# Patient Record
Sex: FEMALE | Race: WHITE | NOT HISPANIC OR LATINO | Employment: FULL TIME | URBAN - METROPOLITAN AREA
[De-identification: names, ages, dates, MRNs, and addresses within clinical notes are randomized per-mention and may not be internally consistent; named-entity substitution may affect disease eponyms.]

---

## 2024-05-22 ENCOUNTER — APPOINTMENT (OUTPATIENT)
Dept: PRIMARY CARE | Facility: CLINIC | Age: 39
End: 2024-05-22
Payer: COMMERCIAL

## 2024-05-29 ENCOUNTER — OFFICE VISIT (OUTPATIENT)
Dept: PRIMARY CARE | Facility: CLINIC | Age: 39
End: 2024-05-29
Payer: COMMERCIAL

## 2024-05-29 VITALS
WEIGHT: 293 LBS | TEMPERATURE: 98.4 F | HEIGHT: 65 IN | DIASTOLIC BLOOD PRESSURE: 80 MMHG | SYSTOLIC BLOOD PRESSURE: 140 MMHG | BODY MASS INDEX: 48.82 KG/M2 | HEART RATE: 97 BPM | OXYGEN SATURATION: 98 %

## 2024-05-29 DIAGNOSIS — E66.01 OBESITY, MORBID, BMI 50 OR HIGHER (MULTI): ICD-10-CM

## 2024-05-29 DIAGNOSIS — I10 ESSENTIAL (PRIMARY) HYPERTENSION: Primary | ICD-10-CM

## 2024-05-29 DIAGNOSIS — R60.0 BILATERAL LEG EDEMA: ICD-10-CM

## 2024-05-29 PROCEDURE — 99204 OFFICE O/P NEW MOD 45 MIN: CPT | Performed by: FAMILY MEDICINE

## 2024-05-29 PROCEDURE — 3075F SYST BP GE 130 - 139MM HG: CPT | Performed by: FAMILY MEDICINE

## 2024-05-29 PROCEDURE — 3079F DIAST BP 80-89 MM HG: CPT | Performed by: FAMILY MEDICINE

## 2024-05-29 RX ORDER — FUROSEMIDE 20 MG/1
20 TABLET ORAL DAILY PRN
COMMUNITY

## 2024-05-29 RX ORDER — TRIAMTERENE AND HYDROCHLOROTHIAZIDE 37.5; 25 MG/1; MG/1
1 CAPSULE ORAL DAILY PRN
COMMUNITY
End: 2024-06-03 | Stop reason: SDUPTHER

## 2024-05-29 RX ORDER — BUTYROSPERMUM PARKII(SHEA BUTTER), SIMMONDSIA CHINENSIS (JOJOBA) SEED OIL, ALOE BARBADENSIS LEAF EXTRACT .01; 1; 3.5 G/100G; G/100G; G/100G
250 LIQUID TOPICAL 2 TIMES DAILY
COMMUNITY

## 2024-05-29 ASSESSMENT — PAIN SCALES - GENERAL: PAINLEVEL: 0-NO PAIN

## 2024-05-29 ASSESSMENT — PATIENT HEALTH QUESTIONNAIRE - PHQ9
SUM OF ALL RESPONSES TO PHQ9 QUESTIONS 1 AND 2: 0
2. FEELING DOWN, DEPRESSED OR HOPELESS: NOT AT ALL
1. LITTLE INTEREST OR PLEASURE IN DOING THINGS: NOT AT ALL

## 2024-05-29 ASSESSMENT — ENCOUNTER SYMPTOMS: RESPIRATORY NEGATIVE: 1

## 2024-05-29 NOTE — PROGRESS NOTES
"Subjective   Patient ID: Lula Del Angel is a 39 y.o. female who presents for Establish Care and Obesity.    The patient is new to the practice and is transferring her care from Dr. Delmy Kendrick ([facility]).   She is here to establish care.    1) HTN/Bilateral leg edema: uncontrolled/uncontrolled, compliant with triamterene, hydrochlorothiazide, and furosemide as needed, previous PCP instructed the patient to take the medications according to leg edema, tries to follow a low salt diet, for exercise does yard work and house chores, wears compression stockings at times.  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    Review of Systems   Respiratory: Negative.     Cardiovascular:  Positive for leg swelling.     Objective   /90 (BP Location: Left arm)   Pulse 97   Temp 36.9 °C (98.4 °F) (Temporal)   Ht 1.651 m (5' 5\")   Wt (!) 154 kg (340 lb)   SpO2 98%   BMI 56.58 kg/m²     Physical Exam  Vitals and nursing note reviewed.   Constitutional:       Appearance: Normal appearance. She is obese.   Cardiovascular:      Rate and Rhythm: Normal rate and regular rhythm.   Pulmonary:      Effort: Pulmonary effort is normal.      Breath sounds: Normal breath sounds.   Musculoskeletal:      Right lower leg: Edema (nonpitting) present.      Left lower leg: Edema (nonpitting) present.   Neurological:      Mental Status: She is alert.     Assessment/Plan   Diagnoses and all orders for this visit:  Essential (primary) hypertension  Uncontrolled.  Patient will take triamterene and hydrochlorothiazide daily.  Low salt " diet.  Regular exercise.  Follow up in 1 month.  Bilateral leg edema  Uncontrolled.  Continue with furosemide as needed.  Low salt diet.  Elevate legs.    Wear compression stockings.  Obesity, morbid, BMI 50 or higher (Multi)  Uncontrolled.  Referred to Yasmeen Morel [facility] for semaglutide consultation.  Await input.  Follow up as directed.

## 2024-06-03 ENCOUNTER — TELEPHONE (OUTPATIENT)
Dept: PRIMARY CARE | Facility: CLINIC | Age: 39
End: 2024-06-03
Payer: COMMERCIAL

## 2024-06-03 DIAGNOSIS — I10 ESSENTIAL (PRIMARY) HYPERTENSION: ICD-10-CM

## 2024-06-03 RX ORDER — TRIAMTERENE AND HYDROCHLOROTHIAZIDE 37.5; 25 MG/1; MG/1
1 CAPSULE ORAL DAILY
Qty: 90 CAPSULE | Refills: 0 | Status: SHIPPED | OUTPATIENT
Start: 2024-06-03

## 2024-06-03 NOTE — TELEPHONE ENCOUNTER
Rx Refill Request Telephone Encounter    Name:  Lula Del Angel  :  [id number]  Medication Name:  Triamterene 37.5 mg she Is out            Specific Pharmacy location:   [facility]   Date of last appointment:    Date of next appointment:    Best number to reach patient:

## 2024-06-26 ENCOUNTER — TELEPHONE (OUTPATIENT)
Dept: PRIMARY CARE | Facility: CLINIC | Age: 39
End: 2024-06-26

## 2024-06-26 ENCOUNTER — APPOINTMENT (OUTPATIENT)
Dept: PRIMARY CARE | Facility: CLINIC | Age: 39
End: 2024-06-26
Payer: COMMERCIAL

## 2024-06-26 VITALS
OXYGEN SATURATION: 94 % | HEART RATE: 77 BPM | HEIGHT: 65 IN | WEIGHT: 293 LBS | TEMPERATURE: 98.5 F | BODY MASS INDEX: 48.82 KG/M2 | SYSTOLIC BLOOD PRESSURE: 129 MMHG | DIASTOLIC BLOOD PRESSURE: 83 MMHG

## 2024-06-26 DIAGNOSIS — I10 ESSENTIAL (PRIMARY) HYPERTENSION: Primary | ICD-10-CM

## 2024-06-26 DIAGNOSIS — Z00.00 ROUTINE GENERAL MEDICAL EXAMINATION AT A HEALTH CARE FACILITY: ICD-10-CM

## 2024-06-26 DIAGNOSIS — R60.0 BILATERAL LEG EDEMA: ICD-10-CM

## 2024-06-26 DIAGNOSIS — I10 ESSENTIAL (PRIMARY) HYPERTENSION: ICD-10-CM

## 2024-06-26 DIAGNOSIS — E66.01 OBESITY, MORBID, BMI 50 OR HIGHER (MULTI): ICD-10-CM

## 2024-06-26 DIAGNOSIS — Z12.4 CERVICAL CANCER SCREENING: ICD-10-CM

## 2024-06-26 DIAGNOSIS — E66.01 OBESITY, MORBID, BMI 50 OR HIGHER (MULTI): Primary | ICD-10-CM

## 2024-06-26 PROCEDURE — 1036F TOBACCO NON-USER: CPT | Performed by: FAMILY MEDICINE

## 2024-06-26 PROCEDURE — 99214 OFFICE O/P EST MOD 30 MIN: CPT | Performed by: FAMILY MEDICINE

## 2024-06-26 PROCEDURE — 3079F DIAST BP 80-89 MM HG: CPT | Performed by: FAMILY MEDICINE

## 2024-06-26 PROCEDURE — 3074F SYST BP LT 130 MM HG: CPT | Performed by: FAMILY MEDICINE

## 2024-06-26 RX ORDER — FUROSEMIDE 20 MG/1
20 TABLET ORAL DAILY PRN
Qty: 30 TABLET | Refills: 0 | Status: SHIPPED | OUTPATIENT
Start: 2024-06-26

## 2024-06-26 RX ORDER — TRIAMTERENE AND HYDROCHLOROTHIAZIDE 37.5; 25 MG/1; MG/1
1 CAPSULE ORAL DAILY
Qty: 90 CAPSULE | Refills: 0 | Status: SHIPPED | OUTPATIENT
Start: 2024-06-26

## 2024-06-26 ASSESSMENT — PATIENT HEALTH QUESTIONNAIRE - PHQ9
SUM OF ALL RESPONSES TO PHQ9 QUESTIONS 1 AND 2: 0
1. LITTLE INTEREST OR PLEASURE IN DOING THINGS: NOT AT ALL
2. FEELING DOWN, DEPRESSED OR HOPELESS: NOT AT ALL

## 2024-06-26 ASSESSMENT — ENCOUNTER SYMPTOMS
RESPIRATORY NEGATIVE: 1
CARDIOVASCULAR NEGATIVE: 1

## 2024-06-26 NOTE — PROGRESS NOTES
"Subjective   Patient ID: Lula Del Angel is a 39 y.o. female who presents for Blood Pressure Check and Medication Question (Would like to know if she could get furosemide refilled ).    1) HTN/Bilateral leg edema: uncontrolled/uncontrolled, was previously taking triamterene, hydrochlorothiazide, and furosemide as needed according to leg edema status, now taking triamterene and hydrochlorothiazide daily, takes furosemide as needed, checks blood pressure at home and average is in the 120s/60s-70s, tries to follow a low salt diet, for exercise does yard work and house chores, wears compression stockings at times.  2) Morbid obesity: uncontrolled, patient has discussed semaglutides with clinical pharmacist, patient is interested, patient would also like to see a bariatric surgeon.      Review of Systems   Respiratory: Negative.     Cardiovascular: Negative.      Objective   /83 (BP Location: Right arm, Patient Position: Sitting, BP Cuff Size: Adult)   Pulse 77   Temp 36.9 °C (98.5 °F) (Temporal)   Ht 1.651 m (5' 5\")   Wt (!) 153 kg (337 lb)   SpO2 94%   BMI 56.08 kg/m²     Physical Exam  Vitals reviewed.   Constitutional:       Appearance: Normal appearance. She is obese.   Cardiovascular:      Rate and Rhythm: Normal rate and regular rhythm.   Pulmonary:      Effort: Pulmonary effort is normal.      Breath sounds: Normal breath sounds.   Neurological:      Mental Status: She is alert.     Assessment/Plan   Diagnoses and all orders for this visit:  Essential (primary) hypertension  Controlled.  Triamterene-hydrochlorothiazid (Dyazide) 37.5-25 mg capsule refilled.  Continue to check blood pressure at home.  Low salt diet.  Regular exercise.  Manage weight.  Follow up in November for CPE.  Bilateral leg edema  Uncontrolled.  Furosemide (Lasix) 20 mg tablet refilled.  Follow up in November for CPE.    Obesity, morbid, BMI 50 or higher (Multi)  Uncontrolled.  Patient looking at semaglutide vs bariatric " surgery.  Patient met with Yasmeen today.  Referral to Bariatric Surgery ordered.  Await input.  Follow up as directed.    Cervical cancer screening  Referral to Gynecology  Await input.  Follow up as directed.    Other orders  -     Follow Up In Primary Care - Health Maintenance; Future

## 2024-08-14 ENCOUNTER — APPOINTMENT (OUTPATIENT)
Dept: SURGERY | Facility: CLINIC | Age: 39
End: 2024-08-14
Payer: COMMERCIAL

## 2024-08-14 VITALS — BODY MASS INDEX: 48.82 KG/M2 | WEIGHT: 293 LBS | HEIGHT: 65 IN

## 2024-08-14 NOTE — PROGRESS NOTES
"Initial Bariatric Nutrition Assessment    Surgeon:  Luciano  Patient is considering: Sleeve gastrectomy/ RNYGB    ASSESSMENT:  Current weight:   Vitals:    08/14/24 1259   Weight: 150 kg (330 lb)     Ht:  1.651 m (5' 5\")   BMI:  Body mass index is 54.91 kg/m².        Pre-Op Excess Body Weight (EBW):   180lbs          This is a 39 y.o. female with morbid obesity (Body mass index is 54.91 kg/m².) who presents to clinic for consideration of bariatric surgery. she has attempted and failed multiple diet and exercise regimens for weight loss.   Initial Onset of obesity was during adolescence.  Their goal for surgery is to   be healthier, be more active, increase confidence, and prevent an comorbidities . The patient has tried multiple diets to lose weight including  low carb, exercise, Mediterranean diet, Noom, stress management  . The patient has been working with her PCP and has lost 10lbs with following a low carb, high protein diet. The patient considers their dietary weakness to be  time of day, schedule and portion control.  The patient reports a  highest weight ever of 340 pounds and lowest weight ever of 200 pounds.  Struggle with retaining fluids r/t elevated BP; on lasix.     Current diet: low carb high protein   The patients exercise is limited to house and yard work.     Food allergies/intolerances: avocado   Chewing/Swallowing/Dentition: no  Nausea / Vomiting / Hx Gastroparesis:  no  Diarrhea/ Constipation: no  Smoking/Tobacco use: no  Vitamins/Minerals supplements: Mg, probiotic   Hours of sleep/night: 3-6 hours     Medications:     Current Outpatient Medications:     furosemide (Lasix) 20 mg tablet, Take 1 tablet (20 mg) by mouth once daily as needed (leg edema) for up to 30 doses., Disp: 30 tablet, Rfl: 0    saccharomyces boulardii (Florastor) 250 mg capsule, Take 1 capsule (250 mg) by mouth 2 times a day. Surebounty 4 in 1 feminine probiotic, Disp: , Rfl:     triamterene-hydrochlorothiazid (Dyazide) " 37.5-25 mg capsule, Take 1 capsule by mouth once daily., Disp: 90 capsule, Rfl: 0      24 HOUR RECALL/DIET HISTORY:  Breakfast: skip or fruit, nut and cheese packet   Snack:    Lunch: leftovers or turkey sandwich or salad or veggies and dip   Snack: fruit, nut and cheese packet   Dinner: crab legs or turkey/chicken (baked/instapot/air fryer) w/starch and veggie   Snack:   Beverages: ICE and water (64oz)   Alcohol: sperical occasions     Cannot tolerate caffeine.     Person responsible for cooking & shopping?  Self   How often do you eat sweet snacks?   2-3x per week   How often do you eat savory snacks?  Every day   How often do you eat out?  3x+  Do you feel overly stuffed?  Yes- clean plate club   Binge Eating?  No   Night Eating?  no  Emotional Eating?  No        READINESS TO LEARN:  Motivation to learn: Interested        Understanding of instruction: Good   Anticipated Compliance:  Good     Family Support:  had BS           Educational Materials Provided:    Schedules for MSWL class and support group   1400  Calorie meal plan   Goals sheet    Nutrition assessment completed today.  Pt will be scheduled for video education class to discuss the 2 week pre op diet, post op protein and fluid goals, vitamin and mineral supplementation, exercise goals, and post op diet progression closer to the time of surgery    Patient is seeking sleeve gastrectomy/RNYGB    Instructed pt on a 1400 calorie meal plan and to measure and record intake daily. Advised to eat 3 meals per and 1-2 high protein snacks.  Recommend eating 4-5 oz per meal. Reviewed the postop behaviors to start practicing.  Set a goal to track steps daily and aim to get 5,500 steps a day.     Patient was receptive to nutritional recommendations, asked numerous questions, and verbalized understanding of the weight loss surgery diet.  Patient expressed understanding about the importance of strict dietary compliance post-surgery to avoid nutritional  deficiencies and achieve optimal weight loss and verbalized intent to follow dietary recommendations.    Malnutrition Screening:   Significant unintentional weight loss? n/a   Eating less than 75% of usual intake for more than 2 weeks? n/a      Nutrition Diagnosis:   Overweight/obesity related to excess energy intake as evidenced by BMI >= 40 kg/m^2.  Food- and nutrition-related knowledge deficit related to lack of prior exposure to surgical weight loss information as evidenced by pt new to surgical program.    Nutrition Interventions:   Modify type and amount of food and nutrients within meals and snacks.  Comprehensive Nutrition Education    Recommendations:  1. Begin following your meal plan.  Measure and record intake daily.   2. Structure meal patterns, eating three meals and 1-2 snacks per day.  3. Aim for 4-5 oz (25g) protein per meal.  Have 1-2 high protein snacks that are 10-20 g protein each.  You can try a tuna or chicken packet, Greek yogurt, 2 string cheeses, Protein bars like Quest, Pure Protein, Premier, or Built Bars. you can also try protein chips form Quest or Atkins.    4. Drink 64oz of calorie-free, caffeine-free, and non-carbonated beverages. Practice taking sips and avoid straws.   5. Practice no drinking 30 minutes before meals, nothing with meals and wait 30 minutes after meals to drink again. Make meals last 30 minutes-chew thoroughly.   6. Limit or omit eating out/sweets/savory snacks to 1-2 times per week.  7. Begin daily multivitamin.  [employer] [facility] has everything you need.  8. Aim to get 5,500 steps a day. Increase physical activity by 10-15 minutes as tolerated to an end goal of 60 minutes 5 x per week. Consistency is the key.    Pre-op Goal weight: lose 5% of body weight    Nutrition Monitoring and Evaluation: 1-2 pound weight loss per week  Criteria: weight check  Need for Follow-up: one month     Patient does meet National Institutes Health guidelines for weight loss surgery, however  needs to demonstrate consistent effort in making dietary changes before giving clearance. It is anticipated that the patient will need at least 1-2  nutritional follow-up visits prior to clearance for surgery.    Shelby Thomason MS, RD, LD  Phone: [phone]

## 2024-08-19 ENCOUNTER — APPOINTMENT (OUTPATIENT)
Dept: SURGERY | Facility: CLINIC | Age: 39
End: 2024-08-19
Payer: COMMERCIAL

## 2024-08-19 VITALS
HEART RATE: 85 BPM | BODY MASS INDEX: 48.82 KG/M2 | HEIGHT: 65 IN | WEIGHT: 293 LBS | DIASTOLIC BLOOD PRESSURE: 95 MMHG | SYSTOLIC BLOOD PRESSURE: 139 MMHG

## 2024-08-19 DIAGNOSIS — I10 ESSENTIAL (PRIMARY) HYPERTENSION: Primary | ICD-10-CM

## 2024-08-19 DIAGNOSIS — E66.01 OBESITY, MORBID, BMI 50 OR HIGHER (MULTI): ICD-10-CM

## 2024-08-19 PROCEDURE — 3008F BODY MASS INDEX DOCD: CPT | Performed by: SURGERY

## 2024-08-19 PROCEDURE — 3080F DIAST BP >= 90 MM HG: CPT | Performed by: SURGERY

## 2024-08-19 PROCEDURE — 3075F SYST BP GE 130 - 139MM HG: CPT | Performed by: SURGERY

## 2024-08-19 PROCEDURE — 99205 OFFICE O/P NEW HI 60 MIN: CPT | Performed by: SURGERY

## 2024-08-19 NOTE — PROGRESS NOTES
Subjective   Date: 8/19/2024 Time: 12:28 PM  Name: Lula Del Angel  MRN: [id number]  This is a 39 y.o. female with morbid obesity (with a BMI: 54.25d) who presents to clinic for consideration of bariatric surgery. she has attempted and failed multiple diet and exercise regimens for weight loss. Initial Onset of obesity was in childhood.  Their goal for surgery is to  be healthier  and lose weight. The patient has tried multiple diets to lose weight including  mediterranean . The patient was most successful with the  working out and restrictive diet . The most pounds lost on this diet were 80 lbs. The patient considers their dietary weakness to be carbs The patient reports a  highest weight ever of 330 pounds and lowest weight ever of 200 pounds Distribution of Obesity: is central. Current diet:  1200 Calories Per Day. Compliance: General Adherence The patient does not exercise   Comorbidities: hypertension controlled with oral meds  Been dealing with obesity her entire life and looking for a better tool.  I did her husbands gastic bypass 15 years ago.  He has kept off weight for 15 years.  Was 660 lbs.  He is here to support his wife.  He also had kidney transplant.   She notes her weaknesses has to do with convenience.  She does what is easy and not a good meal prepper and uses uber eats a lot.  Tries to eat more mediterranean.   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     Menstrual History: regular cycle intervals    SLEEVE Gastric Surgery For Morbid Obesity Laparoscopic Longitudinal Gastrectomy     No acid reflux issues you at this time.  Only food based (stays away from triggers)  Pmh: htn, notes getting better since life style changes and down 14 lbs.  Using baritastic    FAMILY HISTORY:  Family History   Problem Relation Name Age of Onset    Hypertension Mother      Colon polyps Mother      Hypertension Father      Diabetes Father      Cancer Mother's Sister      Cancer Maternal Grandmother          SOCIAL  HISTORY:  Social History     Tobacco Use    Smoking status: Former     Types: Cigarettes    Smokeless tobacco: Never   Vaping Use    Vaping status: Never Used   Substance Use Topics    Alcohol use: Never    Drug use: Never   Quit smoking in 2017    MEDICATIONS:  Prior to Admission Medications:  Medication Documentation Review Audit       Reviewed by Hilary Wolf MD MPH (Physician) on 08/19/24 at 1222      Medication Order Taking? Sig Documenting Provider Last Dose Status   furosemide (Lasix) 20 mg tablet [id number]  Take 1 tablet (20 mg) by mouth once daily as needed (leg edema) for up to 30 doses. Desire Arce DO  Active   saccharomyces boulardii (Florastor) 250 mg capsule [id number] No Take 1 capsule (250 mg) by mouth 2 times a day. Surebounty 4 in 1 feminine probiotic Historical Provider, MD Taking Active   triamterene-hydrochlorothiazid (Dyazide) 37.5-25 mg capsule [id number]  Take 1 capsule by mouth once daily. Desire Arce DO  Active                 Lasix is very rare when has water retention and uses prn.     ALLERGIES:  Allergies   Allergen Reactions    Avocado GI Upset    Ceclor [Cefaclor] Hives and Fever    Codeine Hives and Fever    Sulfa (Sulfonamide Antibiotics) Hives and Fever       REVIEW OF SYSTEMS:  GENERAL: Negative for malaise, significant weight loss and fever  HEAD: Negative for headache, swelling.  NECK: Negative for lumps, goiter, pain and significant neck swelling  RESPIRATORY: Negative for cough, wheezing or shortness of breath.  CARDIOVASCULAR: Negative for chest pain, leg swelling or palpitations.  GI: Negative for abdominal discomfort, blood in stools or black stools or change in bowel habits  : No history of dysuria, frequency or incontinence  MUSCULOSKELETAL: Negative for joint pain or swelling, back pain or muscle pain.  SKIN: Negative for lesions, rash, and itching.  PSYCH: Negative for sleep disturbance, mood disorder and recent psychosocial stressors.  ENDOCRINE: Negative  "for cold or heat intolerance, polyuria, polydipsia and goiter.    Objective   PHYSICAL EXAM:  Visit Vitals  BP (!) 139/95   Pulse 85   Ht 1.651 m (5' 5\")   Wt 148 kg (326 lb)   BMI 54.25 kg/m²   Smoking Status Former   BSA 2.61 m²     General appearance: obese, NAD  Neuro: AOx3  Head: EOMI; no swelling or lesions of scalp or face  ENT:  no lumps or lymphadenopathy, thyroid normal to palpation; oropharynx clear, no swelling or erythema  Skin: warm, no erythema or rashes  Lungs: clear to percussion and auscultation  Heart: regular rhythm and S1, S2 normal  Abdomen: soft, non-tender, no masses, no organomegaly  Extremities: Normal exam of the extremities. No swelling or pain.  Psych: no hurried speech, no flight of ideas, normal affect    IMPRESSION:  Lula Del Angel is a 39 y.o. female with a bmi of Body mass index is 54.25 kg/m². with the following diagnoses and co-morbidities: htn and can benefits from weight loss.  We discussed the sleeve at length.  She is a great candidate.  She has a supportive  who also had gastric bypass.  We discussed focused exercise. She is low risk for surgery.  She has never had surgery.      We discussed the sleeve  at [facility] and all questions were answered. risks and benefits were discussed  The patient understands the risks and benefits of the procedure and how the procedure is performed. The patient understands the risks include but are not limited to bleeding, infection, DVT, PE, pneumonia, myocardial infarction, leak along the staple lines, and weight regain. We discussed lifestyle changes necessary to be successful.       This patient does meet the criteria for a surgical weight loss procedure according to NIH guidelines.  The risks of sleeve gastrectomy, Asya-en-Y gastric bypass, and duodenal switch surgery including bleeding, leak, wound infection, dehydration, ulcers, internal hernia, DVT/PE, prolonged nausea/vomiting, incomplete resolution of associated medical conditions, " reflux, weight regain, vitamin/mineral deficiencies, and death have been explained to the patient and Lula Del Angel has expressed understanding and acceptance of them.     The increased risk of substance and alcohol abuse following bariatric surgery was discussed with the patient, along with the negative consequences of substance/alcohol use after surgery including addiction, worsening of mental health disorders, and injury to the stomach. The risk of smoking and vaping (tobacco or any other substance) after bariatric surgery was explained to the patient. This includes risk of anastamotic ulcers, gastritis, bleeding, perforation, stricture, and PO intolerance.  The patient expressed understanding and acceptance of these risks.    The benefits of the above surgeries including weight loss, improvement/resolution of associated medical and mental health conditions, improved mobility, and decreased mortality have been explained the the patient and Lula Del Angel has expressed understanding and acceptance of them.  Assessment/Plan   PLAN:  The plan of treatment for Lula Del Angel is to continue with the consultations and tests ordered today in hopes of qualifying for pre-operative clearance for bariatric surgery. This includes:  Meal prep so you don't have to order.   Limit rice, bread and pasta to once/week.   Focused exercfise time with goal of 1 hour/day  No pop or coffee drinks  Consult Nutrition for education and MSWL  Consult Psychology  Consult Physical Therapy for limited mobility  Consult cardiology  Consult pulmonology  Labs/CXR/EKG ordered  EGD  PCP for medical optimization  Consult sleep medicine - concern for CLAYTON    The following are some lifestyle changes you should begin to prepare you for your sleeve surgery.   Eliminate soda and other carbonated beverages from your diet. Carbonation will not be well tolerated after surgery. Try Propel, Vitamin Water Zero, Sobe Lifewater, Crystal Light or water.    Increase  fluid consumption to 64 oz daily. Do not drink within 30 minutes of eating as this will liquefy your food and make you hungry more quickly.    Exercise for 30-60 minutes daily. Brisk walking, bike riding and swimming are all examples of healthy exercise. If you are unable to exercise we recommend seated exercise.    Do not skip meals.    Take a multivitamin daily.    Lose weight. In preparation for your surgery it is important that you begin making healthier food choices now. Our dietitian will meet with you to help you select foods lower in calories and higher in nutrition. We would like you to lose at least 10  lbs prior to surgery.     Increase your protein intake to 60 grams per day.    Alcohol is empty calories. Please eliminate while preparing for surgery.    Plan your meals.      General Instruction: 1) Use the information we gave you today to work through your insurance requirements and medical clearances.   2) These documents need to get faxed to the program navigators so they can submit them for approval from your insurance company.   3) Obtain labs today at a  facility. We will call you with any abnormalities and corrections you need to make.   4) Continue to work with your primary care doctor and other specialist so your other health problems are well controlled prior to your surgery.   5) Adopt the recommendations of the program dietician so you develop healthy eating patterns.   6) Work with the sleep team to get your sleep apnea treated to prevent other health problems .   7) Consider attending a support group to learn from other who have been through the process.   8) Come to the MSWL sessions.   45 minutes were spent with patient including history, physical exam, and education.

## 2024-08-19 NOTE — PATIENT INSTRUCTIONS
PLAN:  The plan of treatment for Lula Del Angel is to continue with the consultations and tests ordered today in hopes of qualifying for pre-operative clearance for bariatric surgery. This includes:  Meal prep so you don't have to order.   Limit rice, bread and pasta to once/week.   Focused exercfise time with goal of 1 hour/day  No pop or coffee drinks  Consult Nutrition for education and MSWL  Consult Psychology  Consult Physical Therapy for limited mobility  Consult cardiology  Consult pulmonology  Labs/CXR/EKG ordered  EGD  PCP for medical optimization  Consult sleep medicine - concern for CLAYTON    The following are some lifestyle changes you should begin to prepare you for your sleeve surgery.   Eliminate soda and other carbonated beverages from your diet. Carbonation will not be well tolerated after surgery. Try Propel, Vitamin Water Zero, Sobe Lifewater, Crystal Light or water.    Increase fluid consumption to 64 oz daily. Do not drink within 30 minutes of eating as this will liquefy your food and make you hungry more quickly.    Exercise for 30-60 minutes daily. Brisk walking, bike riding and swimming are all examples of healthy exercise. If you are unable to exercise we recommend seated exercise.    Do not skip meals.    Take a multivitamin daily.    Lose weight. In preparation for your surgery it is important that you begin making healthier food choices now. Our dietitian will meet with you to help you select foods lower in calories and higher in nutrition. We would like you to lose at least 10  lbs prior to surgery.     Increase your protein intake to 60 grams per day.    Alcohol is empty calories. Please eliminate while preparing for surgery.    Plan your meals.      General Instruction: 1) Use the information we gave you today to work through your insurance requirements and medical clearances.   2) These documents need to get faxed to the program navigators so they can submit them for approval from your  insurance company.   3) Obtain labs today at a  facility. We will call you with any abnormalities and corrections you need to make.   4) Continue to work with your primary care doctor and other specialist so your other health problems are well controlled prior to your surgery.   5) Adopt the recommendations of the program dietician so you develop healthy eating patterns.   6) Work with the sleep team to get your sleep apnea treated to prevent other health problems .   7) Consider attending a support group to learn from other who have been through the process.   8) Come to the MSWL sessions.

## 2024-08-21 ENCOUNTER — DOCUMENTATION (OUTPATIENT)
Dept: SURGERY | Facility: CLINIC | Age: 39
End: 2024-08-21
Payer: COMMERCIAL

## 2024-08-22 ENCOUNTER — DOCUMENTATION (OUTPATIENT)
Dept: SURGERY | Facility: CLINIC | Age: 39
End: 2024-08-22
Payer: COMMERCIAL

## 2024-08-27 DIAGNOSIS — I10 ESSENTIAL (PRIMARY) HYPERTENSION: ICD-10-CM

## 2024-08-27 DIAGNOSIS — Z98.84 BARIATRIC SURGERY STATUS: ICD-10-CM

## 2024-08-27 DIAGNOSIS — Z01.818 PRE-OPERATIVE CLEARANCE: ICD-10-CM

## 2024-08-31 DIAGNOSIS — I10 ESSENTIAL (PRIMARY) HYPERTENSION: ICD-10-CM

## 2024-09-03 RX ORDER — TRIAMTERENE AND HYDROCHLOROTHIAZIDE 37.5; 25 MG/1; MG/1
1 CAPSULE ORAL DAILY
Qty: 90 CAPSULE | Refills: 0 | Status: SHIPPED | OUTPATIENT
Start: 2024-09-03

## 2024-09-11 ENCOUNTER — APPOINTMENT (OUTPATIENT)
Dept: SURGERY | Facility: CLINIC | Age: 39
End: 2024-09-11
Payer: COMMERCIAL

## 2024-09-11 VITALS — WEIGHT: 293 LBS | BODY MASS INDEX: 48.82 KG/M2 | HEIGHT: 65 IN

## 2024-09-11 NOTE — PROGRESS NOTES
"PREOPERATIVE, MULTIDISCIPLINARY, MEDICALLY SUPERVISED, REDUCED CALORIE DIET, BEHAVIOR MODIFICATION AND EXERCISE PROGRAM    S:  Has been finding that time is her main struggle with making lifestyle changes. Noted that meal planning is what can be helpful. Sometimes using frozen meals like Lean Cuisine and Healthy Choice. Has been tracking in the [employer] d[employer]on and has been getting ~1200kcals and getting ~120g of protein.  Working towards finding ways to get less kcals with the foods that she is choosing. Has been limiting her restaurant intake. Has been working on eating slowly and monitoring her satisfied feeling. Recently bought a food scale. Has been struggling with the 30s rule. Has been drinking water, CL and flat ICE and has been getting ~64oz daily. No longer using a straw. Has started to take a MVI. Got a standing desk with a treadmill.     O:    Wt:  319lbs      Ht:  1.651 m (5' 5\")         Body mass index is 53.08 kg/m².      Goal: 5% body weight loss over the course of program    Dietary recommendation:   1. Continue to drink 64oz of noncarbonated/caffeine free/sugar free beverages   2. Practice the 30-30-30 rule by drinking between meals.  3. Continue to track your intake and aim to get 1200-1400kcals daily.   4. Have balanced meals that always contain a good source of protein.  5. Increase intake of non-starchy vegetables.  Have 5 servings fruits and vegetables daily.   6. Take a multivitamin daily.  7. Continue to track your steps. Use your treadmill desk. Increase physical activity by 10-15 minutes to an end goal of 60 minutes 5 x per week.    Group Topic: Vitamins & Minerals    Behavioral recommendation: Pt is encouraged to choose a variety of foods to promote a diverse nutrient profile, as well as supplementing one complete multivitamin every day to complement the profile of oral intake.    A/P: Pt appears to have a good understanding of the potential for micronutrient deficiencies and the importance " of supplementing vitamins and minerals both pre- and post-weight loss surgery.   Pt has a goal to consume a balanced diet and supplement one multivitamin daily pre-op; secondary goal to prepare for post-op vitamin regimen. The pt is dong well and meeting her goals. Is cleared from a nutrition standpoint. Will follow up next month for accountability.     Exercise: tracking steps and getting 3,300 steps daily     Shelby Thomason RDN, LD

## 2024-10-10 ENCOUNTER — APPOINTMENT (OUTPATIENT)
Dept: SURGERY | Facility: CLINIC | Age: 39
End: 2024-10-10
Payer: COMMERCIAL

## 2024-10-10 VITALS — WEIGHT: 293 LBS | HEIGHT: 65 IN | BODY MASS INDEX: 48.82 KG/M2

## 2024-10-10 NOTE — PROGRESS NOTES
"PREOPERATIVE, MULTIDISCIPLINARY, MEDICALLY SUPERVISED, REDUCED CALORIE DIET, BEHAVIOR MODIFICATION AND EXERCISE PROGRAM    S:  The pt continues to feel better holistically as she is losing weight. Continues to work on portion control. Continues to track her intake and is getting 1200-1400kcals daily and >60g of protein daily. Eating 3 meals a day. Making an effort to control her snacking so that it does not become a habit. Continues to work on eating slowly. Is struggling to understand the practicality behind the 30s rule since she has not had surgery yet. Has noticed increased stamina with increased exercise.     O:    Wt: 313lbs     Ht: 1.651 m (5' 5\")        Body mass index is 52.09 kg/m².      Goal: 5% body weight loss over the course of program    Dietary recommendation:   1. Continue to drink 64oz of noncarbonated/caffeine free/sugar free beverages   2. Practice the 30-30-30 rule by drinking between meals.  3. Structure your meal plan - have 3 meals and 1 snack daily.  4. Have balanced meals that always contain a good source of protein.  5. Increase intake of non-starchy vegetables.  Have 5 servings fruits and vegetables daily.   6. Take a multivitamin daily.  7. Continue to track your steps and increase as able. Increase physical activity by 10-15 minutes to an end goal of 60 minutes 5 x per week.    Group Topic: Eating Triggers and Controlling Environment    Behavioral recommendation: Pt will be able to identify eating triggers and how to avoid them.    A/P: Pt appears to be able to recognize eating triggers and how to avoid eating when not hungry and/or not eating when it is not time for a meal or snack. Pt will continue to practice being mindful of healthy eating habits. The pt continues to do well. Will put more of an effort to work on the 30s rule. Will follow up in December.     Exercise: hiking/dancing/walking up to 4,500 steps daily     Shelby Thomason RDN, LD  "

## 2024-10-16 ENCOUNTER — APPOINTMENT (OUTPATIENT)
Dept: SLEEP MEDICINE | Facility: CLINIC | Age: 39
End: 2024-10-16
Payer: COMMERCIAL

## 2024-10-16 DIAGNOSIS — Z98.84 BARIATRIC SURGERY STATUS: ICD-10-CM

## 2024-10-16 DIAGNOSIS — E66.01 MORBID OBESITY WITH BMI OF 50.0-59.9, ADULT (MULTI): ICD-10-CM

## 2024-10-16 DIAGNOSIS — Z01.818 PRE-OPERATIVE CLEARANCE: ICD-10-CM

## 2024-10-16 DIAGNOSIS — G47.30 SLEEP DISORDER BREATHING: Primary | ICD-10-CM

## 2024-10-16 DIAGNOSIS — I10 ESSENTIAL (PRIMARY) HYPERTENSION: ICD-10-CM

## 2024-10-16 PROCEDURE — 99443 PR PHYS/QHP TELEPHONE EVALUATION 21-30 MIN: CPT | Performed by: PSYCHIATRY & NEUROLOGY

## 2024-10-16 PROCEDURE — 99203 OFFICE O/P NEW LOW 30 MIN: CPT | Performed by: PSYCHIATRY & NEUROLOGY

## 2024-10-16 NOTE — PROGRESS NOTES
Patient: Lula Del Angel    [id number]  : 1985 -- AGE 39 y.o.    Provider: Jose Enrique Scott MD     Location [facility]   Service Date: 10/16/2024              [facility]  New Visit Note      Virtual or Telephone Consent  An interactive audio telecommunication system which permits real time communications between the patient (at the originating site) and provider (at the distant site) was utilized to provide this telehealth service.   Verbal consent was requested and obtained from Lula Del Angel on this date, 10/16/24 for a telehealth visit.   I verified the patient's identity and physical location in [place].  If this is a new patient to me, I informed the patient of my name and type of active [place] license that I hold.    Due to a technical issue with EPIC, we are unable to do a video call. This encounter was performed as a telephone encounter.    The patient's referring provider is: Hilary Wolf MD MPH    HPI:  Lula Del Angel is a 39 y.o. female with PMH notable for HTN, morbid obesity and bilateral leg edema, who presents today for bariatric surgery clearance.    She has no concerns about her sleep other than not always getting what she feels is not enough sleep. Sleeping 6-7 hours/night. Functions well overall. No caffeine. Wakes up feeling groggy, but feels alert by 9 AM and gets through the day without major drowsiness or energy problems. She has never been a morning person.       NIGHTTIME SYMPTOMS:   Snoring: yes, told by her , but unknown how loud, unknown how frequent it occurs, was first told about it happening about 1 year ago, unknown if worsening over time. She feels it is worse when in a deep sleep. A few times has awoken from a snore  Witnessed apnea: No  Nocturnal gasping: No  Nocturnal choking: No  Sleep walking: No  Sleep talking:  No  Dream enactment: No  Bruxism: No  Morning headaches: rare, none recently  Morning dry mouth/sore throat: generally  no  Nocturia: rare  Restless sleep: No  Sleep paralysis: No was frozen he cannot move  Hypnagogic/hypnopompic hallucinations: No  Bedroom environment is conducive to sleep:  snores    DAYTIME SYMPTOMS  Daytime sleepiness: No  Fatigue: No  Trouble with memory/concentration: No  Dozing: occasionally on a weekend for a few minutes when sitting down after [employer]  Feeling sleepy while driving: Denies    RLS symptoms: No   Cataplexy: No    SLEEP HABITS:   Preferred sleep position: supine has been preferred for the last 4 years. Previously preferred sleeping on her right side  Bedtime: around 10 pm, later if she is scrolling on her phone for too long, but tries to put it away around 10 pm, sleep latency usually within a few minutes. Falls asleep to the TV being on for the noise. Sometimes later on in the night she wakes up and turns off the TV because she is bothered by the light.  Wake time: 7 am  # of nocturnal awakenings: up to 1 to reposition  Napping: no  Total estimated sleep per 24 hrs: 6-8 hours    PRIOR SLEEP STUDIES:  No     PRIOR TREATMENTS:  No stimulants or sleep aids.    Patient Active Problem List   Diagnosis    Essential (primary) hypertension    Obesity, morbid, BMI 50 or higher (Multi)    Bilateral leg edema     No past medical history on file.  No past surgical history on file.  Current Outpatient Medications   Medication Sig Dispense Refill    furosemide (Lasix) 20 mg tablet Take 1 tablet (20 mg) by mouth once daily as needed (leg edema) for up to 30 doses. 30 tablet 0    saccharomyces boulardii (Florastor) 250 mg capsule Take 1 capsule (250 mg) by mouth 2 times a day. Surebounty 4 in 1 feminine probiotic      triamterene-hydrochlorothiazid (Dyazide) 37.5-25 mg capsule TAKE 1 CAPSULE BY MOUTH DAILY 90 capsule 0     No current facility-administered medications for this visit.     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 Smoked about 1/3 pack per day for 15 years  Illicits: no  Caffeine: none      ROS: 12 point ROS positive for SOB with exertion going up 2 steep flights of stairs, weight loss (30 lbs intentionally in the last few months). All other systems were reviewed are negative.    PHYSICAL EXAMINATION:   Height 5' 5\", weight 311 lbs  General: sounds comfortable.   Speech: Normal  Comprehension: Normal  Mood: Stable  Affect: unable to assess  Eyes:   Eyelids: unable to assess            ENT:          Unable to assess patency of nasal passageways or for presence of nasal septum deviation. Unable to clearly view posterior oropharynx to assess tonsils, uvula, and Figueroa tongue score, unable to assess for retrognathia, for presence of high arching hard palate, or dentition quality.         Neck:          unable to assess  Cardiac:  unable to assess pulses or cardiac rate/rhythm.  Pul:        speaking in full sentences. No wheezing  Abd:         unable to assess for obesity  Neuro: Alert, well-oriented.       LABS/DIAGNOSTICS:  Labs from [facility] from 1/2022 include:  Hgb 13.0  CO2 from BMP: 27  TSH 3.43    Echo: none  PFTs: none        ASSESSMENT AND PLAN: Ms. Lula Del Angel is a 39 y.o. female with a history of morbid obesity undergoing the bariatric surgery process, who has known snoring. Due to the potential for increased perioperative risks in the bariatric surgery population, testing for sleep apnea and treating if needed prior to surgery " is recommended.    #morbid obesity  #preoperative clearance  #bariatric surgery status  -clearance depends on sleep study results and if CPAP compliance is needed    #sleep disordered breathing  -We discussed the risk factors for sleep apnea, pathophysiology of sleep apnea, treatment options, and potential long-term complications of untreated CLAYTON, including cardiovascular and metabolic complications. We will start evaluation with a home sleep test.     #HTN  -underlying CLAYTON may be contributing  -home sleep study    All of the above was discussed with the patient in detail. She voiced an understanding of the above and was agreeable to proceed further as advised. Procedure for the sleep study was discussed with her.    41 min spent on this encounter.    FOLLOW UP:  After study to discuss results

## 2024-10-26 ENCOUNTER — LAB (OUTPATIENT)
Dept: LAB | Facility: LAB | Age: 39
End: 2024-10-26
Payer: COMMERCIAL

## 2024-10-26 DIAGNOSIS — Z98.84 BARIATRIC SURGERY STATUS: ICD-10-CM

## 2024-10-26 DIAGNOSIS — I10 ESSENTIAL (PRIMARY) HYPERTENSION: ICD-10-CM

## 2024-10-26 DIAGNOSIS — Z00.00 ROUTINE GENERAL MEDICAL EXAMINATION AT A HEALTH CARE FACILITY: ICD-10-CM

## 2024-10-26 DIAGNOSIS — Z01.818 PRE-OPERATIVE CLEARANCE: ICD-10-CM

## 2024-10-26 LAB
25(OH)D3 SERPL-MCNC: 45 NG/ML (ref 30–100)
ALBUMIN SERPL BCP-MCNC: 3.9 G/DL (ref 3.4–5)
ALP SERPL-CCNC: 57 U/L (ref 33–110)
ALT SERPL W P-5'-P-CCNC: 18 U/L (ref 7–45)
AMPHETAMINES UR QL SCN: NORMAL
ANION GAP SERPL CALC-SCNC: 15 MMOL/L (ref 10–20)
APPEARANCE UR: CLEAR
APTT PPP: 36 SECONDS (ref 27–38)
AST SERPL W P-5'-P-CCNC: 14 U/L (ref 9–39)
BARBITURATES UR QL SCN: NORMAL
BASOPHILS # BLD AUTO: 0.02 X10*3/UL (ref 0–0.1)
BASOPHILS NFR BLD AUTO: 0.3 %
BENZODIAZ UR QL SCN: NORMAL
BILIRUB SERPL-MCNC: 0.6 MG/DL (ref 0–1.2)
BILIRUB UR STRIP.AUTO-MCNC: NEGATIVE MG/DL
BUN SERPL-MCNC: 16 MG/DL (ref 6–23)
BZE UR QL SCN: NORMAL
CALCIUM SERPL-MCNC: 9 MG/DL (ref 8.6–10.3)
CANNABINOIDS UR QL SCN: NORMAL
CHLORIDE SERPL-SCNC: 100 MMOL/L (ref 98–107)
CHOLEST SERPL-MCNC: 130 MG/DL (ref 0–199)
CHOLESTEROL/HDL RATIO: 3.3
CO2 SERPL-SCNC: 28 MMOL/L (ref 21–32)
COLOR UR: ABNORMAL
CREAT SERPL-MCNC: 0.69 MG/DL (ref 0.5–1.05)
CREAT UR-MCNC: 104.3 MG/DL (ref 20–320)
EGFRCR SERPLBLD CKD-EPI 2021: >90 ML/MIN/1.73M*2
EOSINOPHIL # BLD AUTO: 0.31 X10*3/UL (ref 0–0.7)
EOSINOPHIL NFR BLD AUTO: 4.6 %
ERYTHROCYTE [DISTWIDTH] IN BLOOD BY AUTOMATED COUNT: 14.6 % (ref 11.5–14.5)
EST. AVERAGE GLUCOSE BLD GHB EST-MCNC: 97 MG/DL
FENTANYL+NORFENTANYL UR QL SCN: NORMAL
FERRITIN SERPL-MCNC: 64 NG/ML (ref 8–150)
FOLATE SERPL-MCNC: 10.7 NG/ML
GLUCOSE SERPL-MCNC: 94 MG/DL (ref 74–99)
GLUCOSE UR STRIP.AUTO-MCNC: NORMAL MG/DL
HBA1C MFR BLD: 5 %
HCT VFR BLD AUTO: 43.5 % (ref 36–46)
HDLC SERPL-MCNC: 39.7 MG/DL
HGB BLD-MCNC: 14.3 G/DL (ref 12–16)
IMM GRANULOCYTES # BLD AUTO: 0.03 X10*3/UL (ref 0–0.7)
IMM GRANULOCYTES NFR BLD AUTO: 0.4 % (ref 0–0.9)
INR PPP: 1.2 (ref 0.9–1.1)
IRON SATN MFR SERPL: 19 % (ref 25–45)
IRON SERPL-MCNC: 65 UG/DL (ref 35–150)
KETONES UR STRIP.AUTO-MCNC: ABNORMAL MG/DL
LDLC SERPL CALC-MCNC: 71 MG/DL
LEUKOCYTE ESTERASE UR QL STRIP.AUTO: NEGATIVE
LYMPHOCYTES # BLD AUTO: 1.21 X10*3/UL (ref 1.2–4.8)
LYMPHOCYTES NFR BLD AUTO: 18 %
MCH RBC QN AUTO: 28.3 PG (ref 26–34)
MCHC RBC AUTO-ENTMCNC: 32.9 G/DL (ref 32–36)
MCV RBC AUTO: 86 FL (ref 80–100)
METHADONE UR QL SCN: NORMAL
MICROALBUMIN UR-MCNC: <7 MG/L
MICROALBUMIN/CREAT UR: NORMAL MG/G{CREAT}
MONOCYTES # BLD AUTO: 0.46 X10*3/UL (ref 0.1–1)
MONOCYTES NFR BLD AUTO: 6.8 %
NEUTROPHILS # BLD AUTO: 4.7 X10*3/UL (ref 1.2–7.7)
NEUTROPHILS NFR BLD AUTO: 69.9 %
NITRITE UR QL STRIP.AUTO: NEGATIVE
NON HDL CHOLESTEROL: 90 MG/DL (ref 0–149)
NRBC BLD-RTO: 0 /100 WBCS (ref 0–0)
OPIATES UR QL SCN: NORMAL
OXYCODONE+OXYMORPHONE UR QL SCN: NORMAL
PCP UR QL SCN: NORMAL
PH UR STRIP.AUTO: 6 [PH]
PLATELET # BLD AUTO: 345 X10*3/UL (ref 150–450)
POTASSIUM SERPL-SCNC: 4 MMOL/L (ref 3.5–5.3)
PROT SERPL-MCNC: 7 G/DL (ref 6.4–8.2)
PROT UR STRIP.AUTO-MCNC: NEGATIVE MG/DL
PROTHROMBIN TIME: 13.1 SECONDS (ref 9.8–12.8)
PTH-INTACT SERPL-MCNC: 69.2 PG/ML (ref 18.5–88)
RBC # BLD AUTO: 5.05 X10*6/UL (ref 4–5.2)
RBC # UR STRIP.AUTO: NEGATIVE /UL
SODIUM SERPL-SCNC: 139 MMOL/L (ref 136–145)
SP GR UR STRIP.AUTO: 1.02
T4 FREE SERPL-MCNC: 1.14 NG/DL (ref 0.61–1.12)
TIBC SERPL-MCNC: 341 UG/DL (ref 240–445)
TRIGL SERPL-MCNC: 97 MG/DL (ref 0–149)
TSH SERPL-ACNC: 8.11 MIU/L (ref 0.44–3.98)
UIBC SERPL-MCNC: 276 UG/DL (ref 110–370)
UROBILINOGEN UR STRIP.AUTO-MCNC: NORMAL MG/DL
VIT B12 SERPL-MCNC: 520 PG/ML (ref 211–911)
VLDL: 19 MG/DL (ref 0–40)
WBC # BLD AUTO: 6.7 X10*3/UL (ref 4.4–11.3)

## 2024-10-26 PROCEDURE — 83540 ASSAY OF IRON: CPT

## 2024-10-26 PROCEDURE — 82570 ASSAY OF URINE CREATININE: CPT

## 2024-10-26 PROCEDURE — 84630 ASSAY OF ZINC: CPT

## 2024-10-26 PROCEDURE — 82306 VITAMIN D 25 HYDROXY: CPT

## 2024-10-26 PROCEDURE — 80307 DRUG TEST PRSMV CHEM ANLYZR: CPT

## 2024-10-26 PROCEDURE — 84439 ASSAY OF FREE THYROXINE: CPT

## 2024-10-26 PROCEDURE — 36415 COLL VENOUS BLD VENIPUNCTURE: CPT

## 2024-10-26 PROCEDURE — 85730 THROMBOPLASTIN TIME PARTIAL: CPT

## 2024-10-26 PROCEDURE — 82043 UR ALBUMIN QUANTITATIVE: CPT

## 2024-10-26 PROCEDURE — 85610 PROTHROMBIN TIME: CPT

## 2024-10-26 PROCEDURE — 80053 COMPREHEN METABOLIC PANEL: CPT

## 2024-10-26 PROCEDURE — 82607 VITAMIN B-12: CPT

## 2024-10-26 PROCEDURE — 80323 ALKALOIDS NOS: CPT

## 2024-10-26 PROCEDURE — 83036 HEMOGLOBIN GLYCOSYLATED A1C: CPT

## 2024-10-26 PROCEDURE — 81003 URINALYSIS AUTO W/O SCOPE: CPT

## 2024-10-26 PROCEDURE — 82525 ASSAY OF COPPER: CPT

## 2024-10-26 PROCEDURE — 83550 IRON BINDING TEST: CPT

## 2024-10-26 PROCEDURE — 85025 COMPLETE CBC W/AUTO DIFF WBC: CPT

## 2024-10-26 PROCEDURE — 84443 ASSAY THYROID STIM HORMONE: CPT

## 2024-10-26 PROCEDURE — 84425 ASSAY OF VITAMIN B-1: CPT

## 2024-10-26 PROCEDURE — 82728 ASSAY OF FERRITIN: CPT

## 2024-10-26 PROCEDURE — 80061 LIPID PANEL: CPT

## 2024-10-26 PROCEDURE — 83970 ASSAY OF PARATHORMONE: CPT

## 2024-10-26 PROCEDURE — 82746 ASSAY OF FOLIC ACID SERUM: CPT

## 2024-10-30 LAB
COTININE SERPL-MCNC: <5 NG/ML
NICOTINE SERPL-MCNC: <5 NG/ML
VIT B1 PYROPHOSHATE BLD-SCNC: 132 NMOL/L (ref 70–180)

## 2024-11-01 ENCOUNTER — PROCEDURE VISIT (OUTPATIENT)
Dept: SLEEP MEDICINE | Facility: HOSPITAL | Age: 39
End: 2024-11-01
Payer: COMMERCIAL

## 2024-11-01 DIAGNOSIS — Z98.84 BARIATRIC SURGERY STATUS: ICD-10-CM

## 2024-11-01 DIAGNOSIS — Z01.818 PRE-OPERATIVE CLEARANCE: ICD-10-CM

## 2024-11-01 DIAGNOSIS — I10 ESSENTIAL (PRIMARY) HYPERTENSION: ICD-10-CM

## 2024-11-01 DIAGNOSIS — E66.01 MORBID OBESITY WITH BMI OF 50.0-59.9, ADULT (MULTI): ICD-10-CM

## 2024-11-01 DIAGNOSIS — G47.30 SLEEP DISORDER BREATHING: ICD-10-CM

## 2024-11-01 PROCEDURE — 95806 SLEEP STUDY UNATT&RESP EFFT: CPT | Performed by: PSYCHIATRY & NEUROLOGY

## 2024-11-06 ENCOUNTER — APPOINTMENT (OUTPATIENT)
Dept: PRIMARY CARE | Facility: CLINIC | Age: 39
End: 2024-11-06
Payer: COMMERCIAL

## 2024-11-06 VITALS
TEMPERATURE: 97.6 F | HEART RATE: 67 BPM | BODY MASS INDEX: 48.82 KG/M2 | WEIGHT: 293 LBS | SYSTOLIC BLOOD PRESSURE: 124 MMHG | DIASTOLIC BLOOD PRESSURE: 77 MMHG | OXYGEN SATURATION: 96 % | HEIGHT: 65 IN

## 2024-11-06 DIAGNOSIS — Z00.00 WELL ADULT EXAM: Primary | ICD-10-CM

## 2024-11-06 DIAGNOSIS — Z12.31 ENCOUNTER FOR SCREENING MAMMOGRAM FOR BREAST CANCER: ICD-10-CM

## 2024-11-06 DIAGNOSIS — Z12.4 CERVICAL CANCER SCREENING: ICD-10-CM

## 2024-11-06 DIAGNOSIS — I10 ESSENTIAL (PRIMARY) HYPERTENSION: ICD-10-CM

## 2024-11-06 DIAGNOSIS — R60.0 BILATERAL LEG EDEMA: ICD-10-CM

## 2024-11-06 DIAGNOSIS — E66.01 OBESITY, MORBID, BMI 50 OR HIGHER (MULTI): ICD-10-CM

## 2024-11-06 PROCEDURE — 3008F BODY MASS INDEX DOCD: CPT | Performed by: FAMILY MEDICINE

## 2024-11-06 PROCEDURE — 99396 PREV VISIT EST AGE 40-64: CPT | Performed by: FAMILY MEDICINE

## 2024-11-06 PROCEDURE — 1036F TOBACCO NON-USER: CPT | Performed by: FAMILY MEDICINE

## 2024-11-06 PROCEDURE — 99214 OFFICE O/P EST MOD 30 MIN: CPT | Performed by: FAMILY MEDICINE

## 2024-11-06 PROCEDURE — 3074F SYST BP LT 130 MM HG: CPT | Performed by: FAMILY MEDICINE

## 2024-11-06 PROCEDURE — 93000 ELECTROCARDIOGRAM COMPLETE: CPT | Performed by: FAMILY MEDICINE

## 2024-11-06 PROCEDURE — 3078F DIAST BP <80 MM HG: CPT | Performed by: FAMILY MEDICINE

## 2024-11-06 RX ORDER — TRIAMTERENE AND HYDROCHLOROTHIAZIDE 37.5; 25 MG/1; MG/1
1 CAPSULE ORAL DAILY
Qty: 90 CAPSULE | Refills: 1 | Status: SHIPPED | OUTPATIENT
Start: 2024-11-06

## 2024-11-06 ASSESSMENT — ENCOUNTER SYMPTOMS
RESPIRATORY NEGATIVE: 1
HEMATOLOGIC/LYMPHATIC NEGATIVE: 1
GASTROINTESTINAL NEGATIVE: 1
NEUROLOGICAL NEGATIVE: 1
PSYCHIATRIC NEGATIVE: 1
ALLERGIC/IMMUNOLOGIC NEGATIVE: 1
MUSCULOSKELETAL NEGATIVE: 1
CARDIOVASCULAR NEGATIVE: 1
EYES NEGATIVE: 1
CONSTITUTIONAL NEGATIVE: 1

## 2024-11-06 ASSESSMENT — PROMIS GLOBAL HEALTH SCALE
RATE_MENTAL_HEALTH: VERY GOOD
CARRYOUT_PHYSICAL_ACTIVITIES: COMPLETELY
RATE_AVERAGE_PAIN: 0
CARRYOUT_SOCIAL_ACTIVITIES: EXCELLENT
RATE_PHYSICAL_HEALTH: VERY GOOD
RATE_QUALITY_OF_LIFE: VERY GOOD
RATE_GENERAL_HEALTH: VERY GOOD
RATE_SOCIAL_SATISFACTION: VERY GOOD
EMOTIONAL_PROBLEMS: RARELY

## 2024-11-06 ASSESSMENT — COLUMBIA-SUICIDE SEVERITY RATING SCALE - C-SSRS
2. HAVE YOU ACTUALLY HAD ANY THOUGHTS OF KILLING YOURSELF?: NO
1. IN THE PAST MONTH, HAVE YOU WISHED YOU WERE DEAD OR WISHED YOU COULD GO TO SLEEP AND NOT WAKE UP?: NO
6. HAVE YOU EVER DONE ANYTHING, STARTED TO DO ANYTHING, OR PREPARED TO DO ANYTHING TO END YOUR LIFE?: NO

## 2024-11-06 ASSESSMENT — PATIENT HEALTH QUESTIONNAIRE - PHQ9
2. FEELING DOWN, DEPRESSED OR HOPELESS: NOT AT ALL
SUM OF ALL RESPONSES TO PHQ9 QUESTIONS 1 AND 2: 0
1. LITTLE INTEREST OR PLEASURE IN DOING THINGS: NOT AT ALL

## 2024-11-06 NOTE — PROGRESS NOTES
"Subjective   Patient ID: Lula Del Angel is a 39 y.o. female who presents for Annual Exam (Pt wants to discuss blood work ).    Tetanus vaccination is UTD.  She declines an influenza vaccination.  Cervical cancer screening is UTD.  She is under the care of gynecology (Dr. Shaista Llamas at the [facility]).  She is due for a screening mammogram and agrees to receive an order today.  She is due for an EKG and agrees to have one completed today.  Fasting labs were completed recently.    1) HTN/Bilateral leg edema: controlled/uncontrolled, compliant with triamterene and hydrochlorothiazide daily, takes furosemide as needed, checks blood pressure at home and average is in the 110s-120s/60s-70s, tries to follow a low salt diet and follows a 1400 dk/day diet, for exercise does yard work and house chores, will start to use a standing treadmill, wears compression stockings at times.  2) Morbid obesity: uncontrolled, patient undergoing bariatric surgery process, baseline weight was 337 pounds, total current weight loss is 32 pounds, exercise as stated above.    Review of Systems   Constitutional: Negative.    HENT: Negative.     Eyes: Negative.    Respiratory: Negative.     Cardiovascular: Negative.    Gastrointestinal: Negative.    Genitourinary: Negative.    Musculoskeletal: Negative.    Skin: Negative.    Allergic/Immunologic: Negative.    Neurological: Negative.    Hematological: Negative.    Psychiatric/Behavioral: Negative.       Objective   /77 (BP Location: Left arm, Patient Position: Sitting, BP Cuff Size: Adult)   Pulse 67   Temp 36.4 °C (97.6 °F) (Temporal)   Ht 1.651 m (5' 5\")   Wt 138 kg (305 lb)   SpO2 96%   BMI 50.75 kg/m²     Physical Exam  Vitals reviewed.   Constitutional:       Appearance: Normal appearance. She is obese.   HENT:      Head: Normocephalic and atraumatic.      Right Ear: Tympanic membrane, ear canal and external ear normal.      Left Ear: Tympanic membrane, ear canal and external ear " normal.      Nose: Nose normal.      Mouth/Throat:      Mouth: Mucous membranes are moist.      Pharynx: Oropharynx is clear.   Eyes:      Extraocular Movements: Extraocular movements intact.      Conjunctiva/sclera: Conjunctivae normal.      Pupils: Pupils are equal, round, and reactive to light.   Neck:      Vascular: No carotid bruit.      Comments: No thyromegaly  Cardiovascular:      Rate and Rhythm: Normal rate and regular rhythm.      Pulses: Normal pulses.      Heart sounds: Normal heart sounds.   Pulmonary:      Effort: Pulmonary effort is normal.      Breath sounds: Normal breath sounds.   Abdominal:      General: Bowel sounds are normal.      Palpations: Abdomen is soft.   Musculoskeletal:         General: Normal range of motion.      Cervical back: Normal range of motion and neck supple.      Comments: 5/5 muscle strength x 4 extremities   Skin:     General: Skin is warm and dry.   Neurological:      General: No focal deficit present.      Mental Status: She is alert and oriented to person, place, and time.   Psychiatric:         Mood and Affect: Mood normal.         Behavior: Behavior normal.     Assessment/Plan   Diagnoses and all orders for this visit:  Well adult exam  PE completed.  Tetanus vaccination UTD.  Patient declines influenza vaccination.  Cervical cancer screening UTD.  Referred to new gyn (previous one is too far to continue seeing).  EKG completed.  Fasting labs completed.  Repeat exam in 1 year.  Essential (primary) hypertension  Controlled  Triamterene-hydrochlorothiazid (Dyazide) 37.5-25 mg capsule prescribed.  ECG 12 Lead completed.  Continue to check blood pressure at home.  Low salt diet.  Regular exercise.  Manage weight.  Follow up in 6 months if not under the care of cardiology.  Bilateral leg edema  Stable  Triamterene-hydrochlorothiazid (Dyazide) 37.5-25 mg capsule refilled.  Continue with furosemide as needed.  Compression stockings as needed.  Follow up in 1 year.   Obesity,  morbid, BMI 50 or higher (Multi)  Uncontrolled but improving  Continue with bariatric program.  Follow up in 1 year.   Encounter for screening mammogram for breast cancer  BI mammo bilateral screening tomosynthesis ordered.  Await input.  Cervical cancer screening  Referral to Gynecology  Await input.  Follow up as directed.  Other orders  -     Follow Up In Primary Care - Health Maintenance  -     Follow Up In Primary Care - Health Maintenance; Future

## 2024-11-09 ENCOUNTER — TELEMEDICINE CLINICAL SUPPORT (OUTPATIENT)
Dept: SURGERY | Facility: CLINIC | Age: 39
End: 2024-11-09
Payer: COMMERCIAL

## 2024-11-20 DIAGNOSIS — G47.33 OSA (OBSTRUCTIVE SLEEP APNEA): Primary | ICD-10-CM

## 2024-12-10 ENCOUNTER — TELEPHONE (OUTPATIENT)
Dept: SURGERY | Facility: CLINIC | Age: 39
End: 2024-12-10
Payer: COMMERCIAL

## 2024-12-11 ENCOUNTER — APPOINTMENT (OUTPATIENT)
Dept: SURGERY | Facility: CLINIC | Age: 39
End: 2024-12-11
Payer: COMMERCIAL

## 2024-12-11 VITALS — BODY MASS INDEX: 48.82 KG/M2 | HEIGHT: 65 IN | WEIGHT: 293 LBS

## 2024-12-11 NOTE — PROGRESS NOTES
"PREOPERATIVE, MULTIDISCIPLINARY, MEDICALLY SUPERVISED, REDUCED CALORIE DIET, BEHAVIOR MODIFICATION AND EXERCISE PROGRAM    S:  The pt feels like she did well with staying on track with her goals on Thanksgiving. Used a food scale to help with portion sizes. Continues to track her intake and getting 1200-1400kcals and getting at least 60g of protein. Enjoys eating healthy and making good foods choices.  Continues post op behaviors. Attended the NBP class and was very reflective about what permanent changes she will have to make to her lifestyle. Hit a weight plateau and felt discouraged. Found that it is better for her to space out her weight ins.     O:    Wt: 296lbs      Ht: 1.651 m (5' 5\")         Body mass index is 49.26 kg/m².      Goal: 5% body weight loss over the course of program    Dietary recommendation:   1. Continue to drink 64oz of noncarbonated/caffeine free/sugar free beverages   2. Practice the 30-30-30 rule by drinking between meals.  3. Structure your meal plan - have 3 meals and 1 snack daily.  4. Have balanced meals that always contain a good source of protein.  5. Increase intake of non-starchy vegetables.  Have 5 servings fruits and vegetables daily.   6. Take a multivitamin daily.  7. Continue to track your steps. Increase physical activity by 10-15 minutes to an end goal of 60 minutes 5 x per week.    Group Topic: Emotional Eating    Behavioral recommendation: Pt is encouraged to practice identifying the differences between emotional hunger and physical hunger and to implement distraction and confrontation techniques to avoid emotional eating.     A/P: Pt appears to have a good understanding of how to characterize the two different types of hunger.  Pt has a goal to keep a food diary and to utilize at least two of the techniques learned in class to deter emotional eating. The pt continues to do well and meet her goals. Pt is looking to have surgery in May. Will follow up in March for a check " in.     Exercise: uses treadmill, getting ~4-5,000 steps daily     Shelby Thomason RDN, LD

## 2025-01-17 ENCOUNTER — APPOINTMENT (OUTPATIENT)
Dept: SLEEP MEDICINE | Facility: CLINIC | Age: 40
End: 2025-01-17
Payer: COMMERCIAL

## 2025-01-17 DIAGNOSIS — E66.01 MORBID OBESITY WITH BMI OF 50.0-59.9, ADULT (MULTI): ICD-10-CM

## 2025-01-17 DIAGNOSIS — Z98.84 BARIATRIC SURGERY STATUS: ICD-10-CM

## 2025-01-17 DIAGNOSIS — Z01.818 PRE-OPERATIVE CLEARANCE: ICD-10-CM

## 2025-01-17 DIAGNOSIS — G47.33 OSA (OBSTRUCTIVE SLEEP APNEA): Primary | ICD-10-CM

## 2025-01-17 PROCEDURE — 1036F TOBACCO NON-USER: CPT | Performed by: PSYCHIATRY & NEUROLOGY

## 2025-01-17 PROCEDURE — 99212 OFFICE O/P EST SF 10 MIN: CPT | Performed by: PSYCHIATRY & NEUROLOGY

## 2025-01-17 NOTE — PROGRESS NOTES
Patient: Lula Del Angel    [id number]  : 1985 -- AGE 39 y.o.    Provider: Jose Enrique Scott MD     Location [facility]   Service Date: 2025              [facility]  Follow-up Note      Virtual or Telephone Consent  An interactive audio and video telecommunication system which permits real time communications between the patient (at the originating site) and provider (at the distant site) was utilized to provide this telehealth service.   Verbal consent was requested and obtained from Lula Del Angel on this date, 25 for a telehealth visit.   I verified the patient's identity and physical location in [place].  If this is a new patient to me, I informed the patient of my name and type of active [place] license that I hold.      HPI: Lula Del Angel is a 39 y.o. female with CLAYTON who was recently started on CPAP. She is going through the bariatric surgery process. She is here today for her first CPAP follow up visit.     Using CPAP most nights. Sometimes takes it off after 4-5 hours. Tolerates it well overall. No morning dry mouth.    Eager to get her bariatric surgery clearance.      Does not know if she snores still.    PAP DEVICE   Setup date: 24  Type: CPAP  Finding benefit: no - has felt more fatigued from sleeping with CPAP, but not a consistent issue    MASK  Type: F&P nasal Solo    Prior Sleep studies:   -HST 24: weight 138.8 kg, BMI 50.92. CLAYTON with AHI3% 17/h, AHI4% 9/h. No time spent off-supine. Mean SpO2 92.4%, asaf 81%, <=88% for 9.4 minutes. Respiratory events generally occurred in 4 cyclic clusters, suggesting a REM sleep predominance, though this cannot be confirmed on this study due to lack of EEG monitoring.           -report and hypnogram were reviewed personally by me today.      REVIEW OF MACHINE DOWNLOAD:     Patient Active Problem List   Diagnosis    Essential (primary) hypertension    Obesity, morbid, BMI 50 or higher (Multi)    Bilateral  leg edema    CLAYTON (obstructive sleep apnea)     Past Medical History:   Diagnosis Date    Hypertension      Past Surgical History:   Procedure Laterality Date    WISDOM TOOTH EXTRACTION       Current Outpatient Medications on File Prior to Visit   Medication Sig Dispense Refill    furosemide (Lasix) 20 mg tablet Take 1 tablet (20 mg) by mouth once daily as needed (leg edema) for up to 30 doses. 30 tablet 0    saccharomyces boulardii (Florastor) 250 mg capsule Take 1 capsule (250 mg) by mouth 2 times a day. Surebounty 4 in 1 feminine probiotic      triamterene-hydrochlorothiazid (Dyazide) 37.5-25 mg capsule Take 1 capsule by mouth once daily. 90 capsule 1     No current facility-administered medications on file prior to visit.       PHYSICAL EXAMINATION:  General: Awake. Alert. Comfortable. No apparent distress.   Speech: Normal.  Comprehension: Normal.  Mood: Stable.  Affect: Appropriate.  Pul:         Normal respiratory effort.   Neuro: Alert, well-oriented. Cranial nerves II-XII grossly normal and symmetric.  Moves all limbs symmetrically with no evidence of significant focal weakness. No abnormal movements noted. Normal gait      ASSESSMENT AND PLAN: Ms. Lula Del Angel is a 39 y.o. female with mod CLAYTON on CPAP, doing well tolerating it. Sleep apnea is resolved with CPAP. Her compliance is good, just shy of meeting compliance requirements. She is motivated to continue using CPAP, though she notices no subjective benefit. Going through bariatric surgery process and is awaiting clearance from other specialties.      #CLAYTON  -continue nightly CPAP leading up to surgery and in the months following surgery  -I will check her compliance report next week and send her and her surgeon a message regarding surgical clearance    #morbid obesity  #bariatric surgery status  #preoperative clearance  -pt just shy of meeting CPAP compliance requirements, see above    All of the above was discussed with the patient in detail. She voiced  an understanding of the above and was agreeable to proceed further as advised.     17 minutes were spent with the patient plus time spent reviewing the chart, updating the chart as needed, and documenting.     FOLLOW UP: pt to schedule follow up 6 months post bariatric surgery

## 2025-02-04 ENCOUNTER — HOSPITAL ENCOUNTER (OUTPATIENT)
Dept: RADIOLOGY | Facility: HOSPITAL | Age: 40
Discharge: HOME | End: 2025-02-04
Payer: COMMERCIAL

## 2025-02-04 ENCOUNTER — OFFICE VISIT (OUTPATIENT)
Dept: CARDIOLOGY | Facility: CLINIC | Age: 40
End: 2025-02-04
Payer: COMMERCIAL

## 2025-02-04 VITALS
SYSTOLIC BLOOD PRESSURE: 157 MMHG | HEIGHT: 65 IN | WEIGHT: 285 LBS | DIASTOLIC BLOOD PRESSURE: 86 MMHG | HEART RATE: 76 BPM | OXYGEN SATURATION: 98 % | BODY MASS INDEX: 47.48 KG/M2

## 2025-02-04 DIAGNOSIS — I10 ESSENTIAL (PRIMARY) HYPERTENSION: ICD-10-CM

## 2025-02-04 DIAGNOSIS — Z01.818 PRE-OPERATIVE CLEARANCE: ICD-10-CM

## 2025-02-04 DIAGNOSIS — Z98.84 BARIATRIC SURGERY STATUS: ICD-10-CM

## 2025-02-04 PROCEDURE — 99204 OFFICE O/P NEW MOD 45 MIN: CPT | Performed by: NURSE PRACTITIONER

## 2025-02-04 PROCEDURE — 3077F SYST BP >= 140 MM HG: CPT | Performed by: NURSE PRACTITIONER

## 2025-02-04 PROCEDURE — 3079F DIAST BP 80-89 MM HG: CPT | Performed by: NURSE PRACTITIONER

## 2025-02-04 PROCEDURE — 99214 OFFICE O/P EST MOD 30 MIN: CPT | Performed by: NURSE PRACTITIONER

## 2025-02-04 PROCEDURE — 1036F TOBACCO NON-USER: CPT | Performed by: NURSE PRACTITIONER

## 2025-02-04 PROCEDURE — 3008F BODY MASS INDEX DOCD: CPT | Performed by: NURSE PRACTITIONER

## 2025-02-04 PROCEDURE — 71046 X-RAY EXAM CHEST 2 VIEWS: CPT

## 2025-02-04 ASSESSMENT — ENCOUNTER SYMPTOMS
EYES NEGATIVE: 1
RESPIRATORY NEGATIVE: 1
DEPRESSION: 0
HEMATOLOGIC/LYMPHATIC NEGATIVE: 1
NEUROLOGICAL NEGATIVE: 1
GASTROINTESTINAL NEGATIVE: 1
ENDOCRINE NEGATIVE: 1
PSYCHIATRIC NEGATIVE: 1
MUSCULOSKELETAL NEGATIVE: 1
CONSTITUTIONAL NEGATIVE: 1
CARDIOVASCULAR NEGATIVE: 1

## 2025-02-04 NOTE — PROGRESS NOTES
Referred by Dr. Wolf for New Patient Visit and Pre-op Exam (Bariatric surgery candidate)     History Of Present Illness:    Dear Dr. Wolf,     I had the pleasure of meeting Mrs. Del Angel today at [facility] for perioperative cardiac clearance. The patient is seen in collaboration with Dr. Denis. Mrs. Del Angel is a very pleasant 39 year old female with a history of HTN, former history of tobacco use. Family history of atrial fibrillation and CAD. She is here for perioperative cardiac clearance for Bariatric surgery. Denies chest pain, shortness of breath or heart palpitations. She complains of swelling in her legs notices the swelling more in the summer. Currently has Lasix but does not take it often. She is able to walk up a flight of stairs without dyspnea     Review of Systems   Constitutional: Negative.   HENT: Negative.     Eyes: Negative.    Cardiovascular: Negative.    Respiratory: Negative.     Endocrine: Negative.    Hematologic/Lymphatic: Negative.    Skin: Negative.    Musculoskeletal: Negative.    Gastrointestinal: Negative.    Neurological: Negative.    Psychiatric/Behavioral: Negative.        Past Medical History:  She has a past medical history of Hypertension.    Past Surgical History:  She has a past surgical history that includes [place] tooth extraction.      Social History:  She reports that she quit smoking about 7 years ago. Her smoking use included cigarettes. She has never used smokeless tobacco. She reports that she does not currently use alcohol. She reports that she does not use drugs.    Family History:  Family History   Problem Relation Name Age of Onset    Hypertension Mother Pia     Colon polyps Mother Pia     Hypertension Father Aidan     Diabetes Father Aidan     Cancer Mother's Sister Isa     Cancer Maternal Grandmother Heidi         Allergies:  Avocado, Ceclor [cefaclor], Codeine, and Sulfa (sulfonamide antibiotics)    Outpatient  "Medications:  Current Outpatient Medications   Medication Instructions    furosemide (LASIX) 20 mg, oral, Daily PRN    saccharomyces boulardii (FLORASTOR) 250 mg, 2 times daily    triamterene-hydrochlorothiazid (Dyazide) 37.5-25 mg capsule 1 capsule, oral, Daily        Last Recorded Vitals:  Vitals:    02/04/25 1259   BP: 157/86   BP Location: Left arm   Patient Position: Sitting   BP Cuff Size: Large adult   Pulse: 76   SpO2: 98%   Weight: 129 kg (285 lb)   Height: 1.651 m (5' 5\")       Physical Exam:  Physical Exam  Vitals reviewed.   HENT:      Head: Normocephalic.      Nose: Nose normal.   Eyes:      Pupils: Pupils are equal, round, and reactive to light.   Cardiovascular:      Rate and Rhythm: Normal rate and regular rhythm.   Pulmonary:      Effort: Pulmonary effort is normal.      Breath sounds: Normal breath sounds.   Abdominal:      General: Abdomen is flat.      Palpations: Abdomen is soft.   Musculoskeletal:         General: Normal range of motion.      Cervical back: Normal range of motion.   Skin:     General: Skin is warm and dry.   Neurological:      General: No focal deficit present.      Mental Status: He is alert and oriented to person, place, and time.   Psychiatric:         Mood and Affect: Mood normal.            Last Labs:  CBC -  Lab Results   Component Value Date    WBC 6.7 10/26/2024    HGB 14.3 10/26/2024    HCT 43.5 10/26/2024    MCV 86 10/26/2024     10/26/2024       CMP -  Lab Results   Component Value Date    CALCIUM 9.0 10/26/2024    PROT 7.0 10/26/2024    ALBUMIN 3.9 10/26/2024    AST 14 10/26/2024    ALT 18 10/26/2024    ALKPHOS 57 10/26/2024    BILITOT 0.6 10/26/2024       LIPID PANEL -   Lab Results   Component Value Date    CHOL 130 10/26/2024    TRIG 97 10/26/2024    HDL 39.7 10/26/2024    CHHDL 3.3 10/26/2024    VLDL 19 10/26/2024    NHDL 90 10/26/2024       RENAL FUNCTION PANEL -   Lab Results   Component Value Date    GLUCOSE 94 10/26/2024     10/26/2024    K 4.0 " 10/26/2024     10/26/2024    CO2 28 10/26/2024    ANIONGAP 15 10/26/2024    BUN 16 10/26/2024    CREATININE 0.69 10/26/2024    CALCIUM 9.0 10/26/2024    ALBUMIN 3.9 10/26/2024        Lab Results   Component Value Date    HGBA1C 5.0 10/26/2024       Last Cardiology Tests:  ECG:  ECG 12 Lead 11/06/2024 showed sinus rhythm heart rate 68 bpm       Echo    Ejection Fractions:    Cath:    Stress Test:    Cardiac Imaging:        Assessment/Plan   Mrs. Del Angel is very pleasant 39 year old female with a history of HTN, she is here for perioperative cardiac clearance for bariatric surgery. EKG shows a sinus rhythm. She is able to walk up a flight of stairs without dyspnea. She is low cardiac risk for surgery and may proceed to the OR without further cardiac testing. Heart rate and blood pressure are well controlled today. She complains of increased swelling in her legs more in the summer, she was encouraged to take 40 mg of Lasix as needed for the swelling.     Plan   -call with any questions.   -take Lasix 40 mg daily as needed   -continue Dyazide 37.5-25 mg daily   -low cardiac risk for surgery   -follow up after to surgery to assess blood pressure     I appreciate the opportunity to participate in the patient's care, please call with any questions       RITESH Whaley-CNP

## 2025-02-04 NOTE — PATIENT INSTRUCTIONS
CALL WITH ANY QUESTIONS   INCREASE THE LASIX TO 40 MG WHEN NEEDED   CLEARED FOR SURGERY   FOLLOW UP AFTER SURGERY

## 2025-03-19 ENCOUNTER — ANESTHESIA EVENT (OUTPATIENT)
Dept: OPERATING ROOM | Facility: HOSPITAL | Age: 40
End: 2025-03-19
Payer: COMMERCIAL

## 2025-03-19 RX ORDER — DROPERIDOL 2.5 MG/ML
0.62 INJECTION, SOLUTION INTRAMUSCULAR; INTRAVENOUS ONCE AS NEEDED
Status: CANCELLED | OUTPATIENT
Start: 2025-03-19

## 2025-03-19 RX ORDER — ONDANSETRON HYDROCHLORIDE 2 MG/ML
4 INJECTION, SOLUTION INTRAVENOUS ONCE AS NEEDED
Status: CANCELLED | OUTPATIENT
Start: 2025-03-19

## 2025-03-21 ENCOUNTER — ANESTHESIA (OUTPATIENT)
Dept: OPERATING ROOM | Facility: HOSPITAL | Age: 40
End: 2025-03-21
Payer: COMMERCIAL

## 2025-03-21 ENCOUNTER — HOSPITAL ENCOUNTER (OUTPATIENT)
Dept: OPERATING ROOM | Facility: HOSPITAL | Age: 40
Setting detail: OUTPATIENT SURGERY
Discharge: HOME | End: 2025-03-21
Payer: COMMERCIAL

## 2025-03-21 VITALS
RESPIRATION RATE: 16 BRPM | WEIGHT: 273.37 LBS | TEMPERATURE: 97 F | BODY MASS INDEX: 46.67 KG/M2 | OXYGEN SATURATION: 97 % | DIASTOLIC BLOOD PRESSURE: 68 MMHG | HEART RATE: 70 BPM | SYSTOLIC BLOOD PRESSURE: 137 MMHG | HEIGHT: 64 IN

## 2025-03-21 DIAGNOSIS — I10 ESSENTIAL (PRIMARY) HYPERTENSION: ICD-10-CM

## 2025-03-21 DIAGNOSIS — Z01.818 PRE-OPERATIVE CLEARANCE: ICD-10-CM

## 2025-03-21 DIAGNOSIS — Z98.84 BARIATRIC SURGERY STATUS: ICD-10-CM

## 2025-03-21 LAB — PREGNANCY TEST URINE, POC: NEGATIVE

## 2025-03-21 PROCEDURE — 3700000001 HC GENERAL ANESTHESIA TIME - INITIAL BASE CHARGE: Performed by: STUDENT IN AN ORGANIZED HEALTH CARE EDUCATION/TRAINING PROGRAM

## 2025-03-21 PROCEDURE — 2500000004 HC RX 250 GENERAL PHARMACY W/ HCPCS (ALT 636 FOR OP/ED): Performed by: ANESTHESIOLOGY

## 2025-03-21 PROCEDURE — 7100000010 HC PHASE TWO TIME - EACH INCREMENTAL 1 MINUTE: Performed by: STUDENT IN AN ORGANIZED HEALTH CARE EDUCATION/TRAINING PROGRAM

## 2025-03-21 PROCEDURE — 81025 URINE PREGNANCY TEST: CPT | Performed by: ANESTHESIOLOGY

## 2025-03-21 PROCEDURE — 3700000002 HC GENERAL ANESTHESIA TIME - EACH INCREMENTAL 1 MINUTE: Performed by: STUDENT IN AN ORGANIZED HEALTH CARE EDUCATION/TRAINING PROGRAM

## 2025-03-21 PROCEDURE — 7100000009 HC PHASE TWO TIME - INITIAL BASE CHARGE: Performed by: STUDENT IN AN ORGANIZED HEALTH CARE EDUCATION/TRAINING PROGRAM

## 2025-03-21 PROCEDURE — 2500000005 HC RX 250 GENERAL PHARMACY W/O HCPCS: Performed by: NURSE ANESTHETIST, CERTIFIED REGISTERED

## 2025-03-21 PROCEDURE — 2500000004 HC RX 250 GENERAL PHARMACY W/ HCPCS (ALT 636 FOR OP/ED): Performed by: NURSE ANESTHETIST, CERTIFIED REGISTERED

## 2025-03-21 PROCEDURE — 3600000007 HC OR TIME - EACH INCREMENTAL 1 MINUTE - PROCEDURE LEVEL TWO: Performed by: STUDENT IN AN ORGANIZED HEALTH CARE EDUCATION/TRAINING PROGRAM

## 2025-03-21 PROCEDURE — 43235 EGD DIAGNOSTIC BRUSH WASH: CPT | Performed by: SURGERY

## 2025-03-21 PROCEDURE — 3600000002 HC OR TIME - INITIAL BASE CHARGE - PROCEDURE LEVEL TWO: Performed by: STUDENT IN AN ORGANIZED HEALTH CARE EDUCATION/TRAINING PROGRAM

## 2025-03-21 RX ORDER — SODIUM CHLORIDE, SODIUM LACTATE, POTASSIUM CHLORIDE, CALCIUM CHLORIDE 600; 310; 30; 20 MG/100ML; MG/100ML; MG/100ML; MG/100ML
20 INJECTION, SOLUTION INTRAVENOUS CONTINUOUS
Status: DISCONTINUED | OUTPATIENT
Start: 2025-03-21 | End: 2025-03-22 | Stop reason: HOSPADM

## 2025-03-21 RX ORDER — MIDAZOLAM HYDROCHLORIDE 1 MG/ML
INJECTION, SOLUTION INTRAMUSCULAR; INTRAVENOUS AS NEEDED
Status: DISCONTINUED | OUTPATIENT
Start: 2025-03-21 | End: 2025-03-21

## 2025-03-21 RX ORDER — PROPOFOL 10 MG/ML
INJECTION, EMULSION INTRAVENOUS AS NEEDED
Status: DISCONTINUED | OUTPATIENT
Start: 2025-03-21 | End: 2025-03-21

## 2025-03-21 RX ORDER — LIDOCAINE HYDROCHLORIDE 20 MG/ML
SOLUTION OROPHARYNGEAL AS NEEDED
Status: DISCONTINUED | OUTPATIENT
Start: 2025-03-21 | End: 2025-03-21

## 2025-03-21 RX ADMIN — PROPOFOL 250 MCG/KG/MIN: 10 INJECTION, EMULSION INTRAVENOUS at 11:17

## 2025-03-21 RX ADMIN — LIDOCAINE HYDROCHLORIDE 20 MG: 20 SOLUTION ORAL; TOPICAL at 11:16

## 2025-03-21 RX ADMIN — PROPOFOL 100 MG: 10 INJECTION, EMULSION INTRAVENOUS at 11:16

## 2025-03-21 RX ADMIN — MIDAZOLAM 2 MG: 1 INJECTION INTRAMUSCULAR; INTRAVENOUS at 11:07

## 2025-03-21 RX ADMIN — SODIUM CHLORIDE, POTASSIUM CHLORIDE, SODIUM LACTATE AND CALCIUM CHLORIDE 20 ML/HR: 600; 310; 30; 20 INJECTION, SOLUTION INTRAVENOUS at 10:20

## 2025-03-21 SDOH — HEALTH STABILITY: MENTAL HEALTH: CURRENT SMOKER: 0

## 2025-03-21 ASSESSMENT — ENCOUNTER SYMPTOMS
NEUROLOGICAL NEGATIVE: 1
PSYCHIATRIC NEGATIVE: 1
RESPIRATORY NEGATIVE: 1
MUSCULOSKELETAL NEGATIVE: 1
CONSTITUTIONAL NEGATIVE: 1
GASTROINTESTINAL NEGATIVE: 1
CARDIOVASCULAR NEGATIVE: 1

## 2025-03-21 ASSESSMENT — PAIN SCALES - GENERAL
PAINLEVEL_OUTOF10: 0 - NO PAIN
PAIN_LEVEL: 0
PAINLEVEL_OUTOF10: 0 - NO PAIN
PAINLEVEL_OUTOF10: 0 - NO PAIN

## 2025-03-21 ASSESSMENT — PAIN - FUNCTIONAL ASSESSMENT
PAIN_FUNCTIONAL_ASSESSMENT: 0-10

## 2025-03-21 NOTE — ANESTHESIA POSTPROCEDURE EVALUATION
Patient: Lula Del Angel    Procedure Summary       Date: 03/21/25 Room / Location: [facility] OR    Anesthesia Start: 1107 Anesthesia Stop: 1131    Procedure: EGD Diagnosis:       Bariatric surgery status      Pre-operative clearance      Essential (primary) hypertension    Scheduled Providers: Hilary Wolf MD MPH Responsible Provider: Lennox Hernandez DO    Anesthesia Type: MAC ASA Status: 3            Anesthesia Type: MAC    Vitals Value Taken Time   /68 03/21/25 1200   Temp 36.5 03/21/25 1236   Pulse 70 03/21/25 1200   Resp 16 03/21/25 1200   SpO2 97 % 03/21/25 1200       Anesthesia Post Evaluation    Patient location during evaluation: PACU  Patient participation: complete - patient participated  Level of consciousness: awake and alert  Pain score: 0  Pain management: adequate  Airway patency: patent  Two or more strategies used to mitigate risk of obstructive sleep apnea  Cardiovascular status: acceptable  Respiratory status: acceptable  Hydration status: acceptable  Postoperative Nausea and Vomiting: none        No notable events documented.

## 2025-03-21 NOTE — ANESTHESIA PREPROCEDURE EVALUATION
"Patient: Lula Del Angel    Procedure Information       Date/Time: 03/21/25 1045    Scheduled providers: Hilary Wolf MD MPH    Procedure: EGD    Location: [facility] OR        HPI: 40 year old female presenting for EGD while planning for gastric sleeve. History significant for obesity, CLAYTON on CPAP    Denies recent cough, cold, runny nose, fever, flu like symptoms. No exertional angina nor exertional dyspnea. No orthopnea, paroxysmal nocturnal dyspnea, leg swelling. Denies palpitations. No issues with bleeding nor blood clots.    Anesthesia history: none    Visit Vitals  BP (!) 163/91 Comment: lower arm bp   Pulse 96   Temp 36.6 °C (97.9 °F)   Resp 16   Ht 1.626 m (5' 4\")   Wt 124 kg (273 lb 5.9 oz)   SpO2 98%   BMI 46.92 kg/m²   OB Status Having periods   Smoking Status Former   BSA 2.37 m²        [unfilled]      No results found for this or any previous visit from the past 1095 days.       Encounter Date: 11/06/24   ECG 12 Lead    Narrative    NSR        Stress test: none      Relevant Problems   Cardiac   (+) Essential (primary) hypertension      Pulmonary   (+) CLAYTON (obstructive sleep apnea)      Endocrine   (+) Obesity, morbid, BMI 50 or higher (Multi)       Clinical information reviewed:   Tobacco  Allergies  Meds   Med Hx  Surg Hx  OB Status  Fam Hx  Soc   Hx        NPO Detail:  NPO/Void Status  Carbohydrate Drink Given Prior to Surgery? : N  Date of Last Liquid: 03/20/25  Time of Last Liquid: 2300  Date of Last Solid: 03/20/25  Time of Last Solid: 2300  Last Intake Type: Clear fluids  Time of Last Void: 0945         Physical Exam    Airway  Mallampati: II  TM distance: >3 FB  Neck ROM: full     Cardiovascular   Rhythm: regular  Rate: normal     Dental - normal exam     Pulmonary   Breath sounds clear to auscultation     Abdominal   (+) obese  Abdomen: soft             Anesthesia Plan    History of general anesthesia?: no  History of complications of general anesthesia?: no    ASA 3 "     MAC     The patient is not a current smoker.    intravenous induction   Anesthetic plan and risks discussed with patient.  Use of blood products discussed with patient who consented to blood products.    Plan discussed with CRNA.

## 2025-03-21 NOTE — DISCHARGE INSTRUCTIONS

## 2025-03-21 NOTE — H&P
History Of Present Illness  Lula Del Angel is a 40 y.o. female presenting with history of morbid obesity who has elected to proceed with bariatric surgical intervention.  She now presents for her preoperative upper endoscopy.     Past Medical History  Past Medical History:   Diagnosis Date    Hypertension        Surgical History  Past Surgical History:   Procedure Laterality Date    [place] TOOTH EXTRACTION          Social History  She reports that she quit smoking about 7 years ago. Her smoking use included cigarettes. She has never used smokeless tobacco. She reports that she does not currently use alcohol. She reports that she does not use drugs.    Family History  Family History   Problem Relation Name Age of Onset    Hypertension Mother Pia     Colon polyps Mother Pia     Hypertension Father Aidan     Diabetes Father Adian     Cancer Mother's Sister Isa     Cancer Maternal Grandmother Heidi         Allergies  Avocado, Ceclor [cefaclor], Codeine, and Sulfa (sulfonamide antibiotics)    Review of Systems   Constitutional: Negative.    HENT: Negative.     Respiratory: Negative.     Cardiovascular: Negative.    Gastrointestinal: Negative.    Genitourinary: Negative.    Musculoskeletal: Negative.    Skin: Negative.    Neurological: Negative.    Psychiatric/Behavioral: Negative.          Physical Exam  Physical Exam:   Constitutional: Well developed, awake/alert/oriented x3, no distress, alert and cooperative  Eyes: PERRL, EOMI, clear sclera  Head/Neck: Neck supple, no apparent injury, No JVD, trachea midline  Respiratory/Thorax: good chest expansion, thorax symmetric  Cardiovascular: Regular, rate and rhythm,  2+ equal pulses of the extremities  Gastrointestinal: Nondistended, soft, minimally tender, no rebound tenderness or guarding, no masses palpable  Musculoskeletal: ROM intact, no joint swelling, normal strength  Extremities: normal extremities, no cyanosis edema, contusions or wounds, no  clubbing  Neurological: alert and oriented x3, intact senses,  Psychological: Appropriate mood and behavior  Skin: Warm and dry, no lesions, no rashes       Last Recorded Vitals  There were no vitals taken for this visit.    Relevant Results             Assessment/Plan       40F with morbid obesity, BMI 47, presenting for preop EGD.       I spent 60 minutes in the professional and overall care of this patient.      Don Martins MD

## 2025-03-26 ENCOUNTER — APPOINTMENT (OUTPATIENT)
Dept: SURGERY | Facility: CLINIC | Age: 40
End: 2025-03-26
Payer: COMMERCIAL

## 2025-03-26 NOTE — PROGRESS NOTES
PREOPERATIVE, MULTIDISCIPLINARY, MEDICALLY SUPERVISED, REDUCED CALORIE DIET, BEHAVIOR MODIFICATION AND EXERCISE PROGRAM    S:  The pt has lost over 50lbs since starting the program. Has been focusing on increasing exercise. The pt continues to track her intake and getting 1200-1500kcals and getting 100-120g of protein. Continues to practice post op behaviors, staying hydrated and taking a MVI.     O:    Wt: 273lbs   (3/21/25)    Goal: 5% body weight loss over the course of program    Dietary recommendation:   1. Continue to drink 64oz of noncarbonated/caffeine free/sugar free beverages   2. Practice the 30-30-30 rule by drinking between meals.  3. Continue to track your intake damaris I'm to get 1200-1400kcals.   4. Have balanced meals that always contain a good source of protein.  5. Increase intake of non-starchy vegetables.  Have 5 servings fruits and vegetables daily.   6. Take a multivitamin daily.  7. Continue your exercise routine. Increase physical activity by 10-15 minutes to an end goal of 60 minutes 5 x per week.    Group Topic: Going Lean With Protein  Behavioral recommendation: Pt is encouraged to identify and recall sources of lean protein.    A/P: Pt appears to have a good understanding of how to incorporate lean protein into their daily meal pattern.  Pt has a goal to add a lean protein source to each meal. The pt continues to do well and meet her goals. Will call the pt to review the 2 week pre op diet.     Exercise: refit class for 60 minutes one day a week; gym (treadmill and bike) 3x per week for 45 minutes getting 5,000-10,000 steps per day     Shelby Thomason RDN, LD

## 2025-04-02 LAB
LABORATORY COMMENT REPORT: NORMAL
PATH REPORT.FINAL DX SPEC: NORMAL
PATH REPORT.GROSS SPEC: NORMAL
PATH REPORT.RELEVANT HX SPEC: NORMAL
PATH REPORT.TOTAL CANCER: NORMAL

## 2025-04-18 DIAGNOSIS — Z01.818 PREOPERATIVE CLEARANCE: ICD-10-CM

## 2025-04-18 DIAGNOSIS — Z98.84 BARIATRIC SURGERY STATUS: Primary | ICD-10-CM

## 2025-04-29 ENCOUNTER — TELEPHONE (OUTPATIENT)
Dept: SURGERY | Facility: CLINIC | Age: 40
End: 2025-04-29
Payer: COMMERCIAL

## 2025-04-29 NOTE — TELEPHONE ENCOUNTER
Called pt to discuss 2 week pre op diet which they are to start on 5/8. Also reviewed vitamins and post op behaviors. Encouraged the pt to contact this RD if she has any questions.     Shelby Thomason MS, RD, LD  Phone: [phone]

## 2025-05-05 ENCOUNTER — PRE-ADMISSION TESTING (OUTPATIENT)
Dept: PREADMISSION TESTING | Facility: HOSPITAL | Age: 40
End: 2025-05-05
Payer: COMMERCIAL

## 2025-05-05 ENCOUNTER — APPOINTMENT (OUTPATIENT)
Dept: SURGERY | Facility: CLINIC | Age: 40
End: 2025-05-05
Payer: COMMERCIAL

## 2025-05-05 VITALS
HEART RATE: 62 BPM | RESPIRATION RATE: 18 BRPM | BODY MASS INDEX: 46.37 KG/M2 | DIASTOLIC BLOOD PRESSURE: 84 MMHG | HEIGHT: 64 IN | SYSTOLIC BLOOD PRESSURE: 144 MMHG | OXYGEN SATURATION: 96 % | WEIGHT: 271.6 LBS

## 2025-05-05 VITALS
DIASTOLIC BLOOD PRESSURE: 77 MMHG | TEMPERATURE: 97.5 F | OXYGEN SATURATION: 98 % | RESPIRATION RATE: 16 BRPM | SYSTOLIC BLOOD PRESSURE: 118 MMHG | WEIGHT: 271.17 LBS | BODY MASS INDEX: 46.29 KG/M2 | HEIGHT: 64 IN | HEART RATE: 71 BPM

## 2025-05-05 DIAGNOSIS — Z01.818 PRE-OPERATIVE EXAMINATION: Primary | ICD-10-CM

## 2025-05-05 DIAGNOSIS — I10 ESSENTIAL (PRIMARY) HYPERTENSION: ICD-10-CM

## 2025-05-05 DIAGNOSIS — Z01.818 PREOPERATIVE CLEARANCE: ICD-10-CM

## 2025-05-05 DIAGNOSIS — Z98.84 BARIATRIC SURGERY STATUS: ICD-10-CM

## 2025-05-05 DIAGNOSIS — E66.01 OBESITY, MORBID, BMI 50 OR HIGHER (MULTI): ICD-10-CM

## 2025-05-05 LAB
ABO GROUP (TYPE) IN BLOOD: NORMAL
ALBUMIN SERPL BCP-MCNC: 3.7 G/DL (ref 3.4–5)
ALP SERPL-CCNC: 47 U/L (ref 33–110)
ALT SERPL W P-5'-P-CCNC: 18 U/L (ref 7–45)
ANION GAP SERPL CALC-SCNC: 10 MMOL/L (ref 10–20)
ANTIBODY SCREEN: NORMAL
APPEARANCE UR: CLEAR
AST SERPL W P-5'-P-CCNC: 12 U/L (ref 9–39)
BASOPHILS # BLD AUTO: 0.02 X10*3/UL (ref 0–0.1)
BASOPHILS NFR BLD AUTO: 0.3 %
BILIRUB SERPL-MCNC: 0.6 MG/DL (ref 0–1.2)
BILIRUB UR STRIP.AUTO-MCNC: NEGATIVE MG/DL
BUN SERPL-MCNC: 19 MG/DL (ref 6–23)
CALCIUM SERPL-MCNC: 8.8 MG/DL (ref 8.6–10.3)
CHLORIDE SERPL-SCNC: 105 MMOL/L (ref 98–107)
CO2 SERPL-SCNC: 29 MMOL/L (ref 21–32)
COLOR UR: YELLOW
CREAT SERPL-MCNC: 0.74 MG/DL (ref 0.5–1.05)
EGFRCR SERPLBLD CKD-EPI 2021: >90 ML/MIN/1.73M*2
EOSINOPHIL # BLD AUTO: 0.29 X10*3/UL (ref 0–0.7)
EOSINOPHIL NFR BLD AUTO: 5 %
ERYTHROCYTE [DISTWIDTH] IN BLOOD BY AUTOMATED COUNT: 14.4 % (ref 11.5–14.5)
GLUCOSE SERPL-MCNC: 92 MG/DL (ref 74–99)
GLUCOSE UR STRIP.AUTO-MCNC: NORMAL MG/DL
HCT VFR BLD AUTO: 41 % (ref 36–46)
HGB BLD-MCNC: 13.5 G/DL (ref 12–16)
HOLD SPECIMEN: NORMAL
IMM GRANULOCYTES # BLD AUTO: 0.01 X10*3/UL (ref 0–0.7)
IMM GRANULOCYTES NFR BLD AUTO: 0.2 % (ref 0–0.9)
INR PPP: 1.1 (ref 0.9–1.1)
KETONES UR STRIP.AUTO-MCNC: NEGATIVE MG/DL
LEUKOCYTE ESTERASE UR QL STRIP.AUTO: NEGATIVE
LYMPHOCYTES # BLD AUTO: 1.25 X10*3/UL (ref 1.2–4.8)
LYMPHOCYTES NFR BLD AUTO: 21.6 %
MCH RBC QN AUTO: 29.5 PG (ref 26–34)
MCHC RBC AUTO-ENTMCNC: 32.9 G/DL (ref 32–36)
MCV RBC AUTO: 90 FL (ref 80–100)
MONOCYTES # BLD AUTO: 0.37 X10*3/UL (ref 0.1–1)
MONOCYTES NFR BLD AUTO: 6.4 %
MUCOUS THREADS #/AREA URNS AUTO: NORMAL /LPF
NEUTROPHILS # BLD AUTO: 3.85 X10*3/UL (ref 1.2–7.7)
NEUTROPHILS NFR BLD AUTO: 66.5 %
NITRITE UR QL STRIP.AUTO: NEGATIVE
NRBC BLD-RTO: 0 /100 WBCS (ref 0–0)
PH UR STRIP.AUTO: 5.5 [PH]
PLATELET # BLD AUTO: 253 X10*3/UL (ref 150–450)
POTASSIUM SERPL-SCNC: 4.4 MMOL/L (ref 3.5–5.3)
PROT SERPL-MCNC: 6.6 G/DL (ref 6.4–8.2)
PROT UR STRIP.AUTO-MCNC: NEGATIVE MG/DL
PROTHROMBIN TIME: 12.2 SECONDS (ref 9.8–12.4)
RBC # BLD AUTO: 4.57 X10*6/UL (ref 4–5.2)
RBC # UR STRIP.AUTO: ABNORMAL MG/DL
RBC #/AREA URNS AUTO: NORMAL /HPF
RH FACTOR (ANTIGEN D): NORMAL
SODIUM SERPL-SCNC: 140 MMOL/L (ref 136–145)
SP GR UR STRIP.AUTO: 1.03
SQUAMOUS #/AREA URNS AUTO: NORMAL /HPF
UROBILINOGEN UR STRIP.AUTO-MCNC: NORMAL MG/DL
WBC # BLD AUTO: 5.8 X10*3/UL (ref 4.4–11.3)
WBC #/AREA URNS AUTO: NORMAL /HPF
WBC CLUMPS #/AREA URNS AUTO: NORMAL /HPF

## 2025-05-05 PROCEDURE — 81001 URINALYSIS AUTO W/SCOPE: CPT | Performed by: SURGERY

## 2025-05-05 PROCEDURE — 85025 COMPLETE CBC W/AUTO DIFF WBC: CPT | Performed by: SURGERY

## 2025-05-05 PROCEDURE — 3079F DIAST BP 80-89 MM HG: CPT | Performed by: SURGERY

## 2025-05-05 PROCEDURE — 99215 OFFICE O/P EST HI 40 MIN: CPT | Performed by: SURGERY

## 2025-05-05 PROCEDURE — 85610 PROTHROMBIN TIME: CPT | Performed by: SURGERY

## 2025-05-05 PROCEDURE — 99204 OFFICE O/P NEW MOD 45 MIN: CPT | Performed by: REGISTERED NURSE

## 2025-05-05 PROCEDURE — 1036F TOBACCO NON-USER: CPT | Performed by: SURGERY

## 2025-05-05 PROCEDURE — 3008F BODY MASS INDEX DOCD: CPT | Performed by: SURGERY

## 2025-05-05 PROCEDURE — 3077F SYST BP >= 140 MM HG: CPT | Performed by: SURGERY

## 2025-05-05 PROCEDURE — 80323 ALKALOIDS NOS: CPT | Performed by: SURGERY

## 2025-05-05 PROCEDURE — 80053 COMPREHEN METABOLIC PANEL: CPT | Performed by: SURGERY

## 2025-05-05 PROCEDURE — 86850 RBC ANTIBODY SCREEN: CPT | Performed by: SURGERY

## 2025-05-05 ASSESSMENT — DUKE ACTIVITY SCORE INDEX (DASI)
CAN YOU PARTICIPATE IN MODERATE RECREATIONAL ACTIVITIES LIKE GOLF, BOWLING, DANCING, DOUBLES TENNIS OR THROWING A BASEBALL OR FOOTBALL: YES
CAN YOU PARTICIPATE IN STRENOUS SPORTS LIKE SWIMMING, SINGLES TENNIS, FOOTBALL, BASKETBALL, OR SKIING: YES
CAN YOU DO HEAVY WORK AROUND THE HOUSE LIKE SCRUBBING FLOORS OR LIFTING AND MOVING HEAVY FURNITURE: YES
CAN YOU HAVE SEXUAL RELATIONS: YES
CAN YOU CLIMB A FLIGHT OF STAIRS OR WALK UP A HILL: YES
DASI METS SCORE: 9.9
CAN YOU WALK A BLOCK OR TWO ON LEVEL GROUND: YES
CAN YOU RUN A SHORT DISTANCE: YES
CAN YOU TAKE CARE OF YOURSELF (EAT, DRESS, BATHE, OR USE TOILET): YES
CAN YOU DO LIGHT WORK AROUND THE HOUSE LIKE DUSTING OR WASHING DISHES: YES
CAN YOU DO MODERATE WORK AROUND THE HOUSE LIKE VACUUMING, SWEEPING FLOORS OR CARRYING GROCERIES: YES
TOTAL_SCORE: 58.2
CAN YOU WALK INDOORS, SUCH AS AROUND YOUR HOUSE: YES
CAN YOU DO YARD WORK LIKE RAKING LEAVES, WEEDING OR PUSHING A MOWER: YES

## 2025-05-05 ASSESSMENT — ENCOUNTER SYMPTOMS
MUSCULOSKELETAL NEGATIVE: 1
NEUROLOGICAL NEGATIVE: 1
GASTROINTESTINAL NEGATIVE: 1
NECK NEGATIVE: 1
EYES NEGATIVE: 1
RESPIRATORY NEGATIVE: 1
CONSTITUTIONAL NEGATIVE: 1
ENDOCRINE NEGATIVE: 1

## 2025-05-05 ASSESSMENT — PAIN - FUNCTIONAL ASSESSMENT: PAIN_FUNCTIONAL_ASSESSMENT: 0-10

## 2025-05-05 ASSESSMENT — LIFESTYLE VARIABLES: SMOKING_STATUS: NONSMOKER

## 2025-05-05 ASSESSMENT — PAIN SCALES - GENERAL: PAINLEVEL_OUTOF10: 0 - NO PAIN

## 2025-05-05 NOTE — PATIENT INSTRUCTIONS
PLAN:  You are scheduled for a sleeve on     You will receive a phone call the day prior to surgery with your OR arrival time.    Be sure to read over your Pre-Op book for final preparation for surgery.    Make a shopping list and  your supplements prior to surgery.    You have been provided with a pain script today- TAKE THIS TODAY to your pharmacy as it may need prior authorization.    Electronic prescriptions were sent to your pharmacy. Prescriptions for Omeprazole (anacid), Enoxaparin (DVT Prophylaxis) and Ondansetron (anti-nausea) have been sent to your retail pharmacy. You will likely get these medications in the hospital.  If not you can Pick these up after surgery.    Be sure to take the omeprazole, open the capsule and sprinkle over SF applesauce and take every day for 6 months after surgery. DO NOT MISS A DOSE. This will help you to heal.    Call with any questions! [phone] gloria Richardson    1) Reread your pre op manual to be familiar with pre op and post op expectations    2) Follow the pre op diet exactly so your liver shrinks down prior to your surgery:     -DAY BEFORE SURGERY: NO SOLID FOODS! You may have up to 3 protein shakes BEFORE 3pm. After 3pm, you may only have CLEAR, non-caloric liquids w/ NO red dye.   -DAY OF SURGERY: Do not eat or drink anything unless specified by the doctor.    3) Obtain the over the counter items you need in the home prior to your surgery    4) Be certain you have an appointment with your medical doctors who will need to tweak your prescription medications after surgery as you drop weight rapidly.      5) Make sure you have follow up appointments 2 weeks after surgery and at the 6 week elliot    6) --Family Medical Leave paperwork is available through your human resources department. Please call your human resources department to obtain the paperwork. You must allow up to two weeks for paperwork to be completed. You will need to fill out a portion of the paperwork  before faxing it to our office for completion. In addition, indicate what kind of leave you are requesting (intermittent or continuous) and how long you are requesting the leave extend. Please fax the paperwork to [phone]   Operative and blood transfusion consent forms were signed by the patient and witnessed today.    Prescriptions for all required post-operative home medications were sent to the [facility] pharmacy today and will be delivered to the patient's bedside on the day of discharge.    Further education was provided on day of surgery instructions and what to expect from the inpatient admission after surgery.

## 2025-05-05 NOTE — CPM/PAT H&P
CPM/PAT Evaluation       Name: Lula Del Angel (Lula Del Angel)  /Age: 1985/40 y.o.     In-Person       Chief Complaint: Evaluation prior to surgery    HPI    Medical History[1]    Surgical History[2]    Patient  reports that she is not currently sexually active and has had partner(s) who are male. She reports using the following method of birth control/protection: Abstinence.    Family History[3]    Allergies[4]    Prior to Admission medications    Medication Sig Start Date End Date Taking? Authorizing Provider   furosemide (Lasix) 20 mg tablet Take 1 tablet (20 mg) by mouth once daily as needed (leg edema) for up to 30 doses. 24  Yes Desire Arce DO   saccharomyces boulardii (Florastor) 250 mg capsule Take 1 capsule (250 mg) by mouth 2 times a day. Surebounty 4 in 1 feminine probiotic   Yes Historical Provider, MD   triamterene-hydrochlorothiazid (Dyazide) 37.5-25 mg capsule Take 1 capsule by mouth once daily. 24  Yes DO DENY Spears ROS:   Constitutional:   neg    Neuro/Psych:   neg    Eyes:   neg    Ears:   neg    Nose:   Mouth:   neg    Throat:   neg    Neck:   neg    Cardio:    BLE   peripheral edema  Respiratory:   neg    Endocrine:   neg    GI:   neg    :   neg    Musculoskeletal:   neg    Hematologic:   neg    Skin:  neg        Physical Exam  Vitals reviewed.   Constitutional:       Appearance: She is obese.   HENT:      Head: Normocephalic and atraumatic.      Nose: Nose normal.   Neck:      Vascular: No carotid bruit.   Cardiovascular:      Rate and Rhythm: Normal rate and regular rhythm.      Pulses: Normal pulses.      Heart sounds: Normal heart sounds.   Pulmonary:      Effort: Pulmonary effort is normal.      Breath sounds: Normal breath sounds.   Abdominal:      Palpations: Abdomen is soft.   Musculoskeletal:         General: Normal range of motion.      Cervical back: Normal range of motion and neck supple.   Skin:     General: Skin is warm and dry.      Capillary  "Refill: Capillary refill takes less than 2 seconds.   Neurological:      General: No focal deficit present.      Mental Status: She is alert and oriented to person, place, and time.   Psychiatric:         Mood and Affect: Mood normal.         Behavior: Behavior normal.         Thought Content: Thought content normal.         Judgment: Judgment normal.          PAT AIRWAY:   Airway:     Neck ROM::  Full  normal        Visit Vitals  /77   Pulse 71   Temp 36.4 °C (97.5 °F) (Tympanic)   Resp 16   Ht 1.626 m (5' 4\")   Wt 123 kg (271 lb 2.7 oz)   SpO2 98%   BMI 46.55 kg/m²   OB Status Having periods   Smoking Status Former   BSA 2.36 m²       DASI Risk Score      Flowsheet Row Pre-Admission Testing from 5/5/2025 in [facility] Questionnaire Series Submission from 5/4/2025 in [facility] OR with Generic Provider Adelita   Can you take care of yourself (eat, dress, bathe, or use toilet)?  2.75 filed at 05/05/2025 0725 2.75  filed at 05/04/2025 1840   Can you walk indoors, such as around your house? 1.75 filed at 05/05/2025 0725 1.75  filed at 05/04/2025 1840   Can you walk a block or two on level ground?  2.75 filed at 05/05/2025 0725 2.75  filed at 05/04/2025 1840   Can you climb a flight of stairs or walk up a hill? 5.5 filed at 05/05/2025 0725 5.5  filed at 05/04/2025 1840   Can you run a short distance? 8 filed at 05/05/2025 0725 8  filed at 05/04/2025 1840   Can you do light work around the house like dusting or washing dishes? 2.7 filed at 05/05/2025 0725 2.7  filed at 05/04/2025 1840   Can you do moderate work around the house like vacuuming, sweeping floors or carrying groceries? 3.5 filed at 05/05/2025 0725 3.5  filed at 05/04/2025 1840   Can you do heavy work around the house like scrubbing floors or lifting and moving heavy furniture?  8 filed at 05/05/2025 0725 8  filed at 05/04/2025 1840   Can you do yard work like raking leaves, weeding or pushing a mower? 4.5 filed at " 05/05/2025 0725 4.5  filed at 05/04/2025 1840   Can you have sexual relations? 5.25 filed at 05/05/2025 0725 5.25  filed at 05/04/2025 1840   Can you participate in moderate recreational activities like golf, bowling, dancing, doubles tennis or throwing a baseball or football? 6 filed at 05/05/2025 0725 6  filed at 05/04/2025 1840   Can you participate in strenous sports like swimming, singles tennis, football, basketball, or skiing? 7.5 filed at 05/05/2025 0725 7.5  filed at 05/04/2025 1840   DASI SCORE 58.2 filed at 05/05/2025 0725 58.2  filed at 05/04/2025 1840   METS Score (Will be calculated only when all the questions are answered) 9.9 filed at 05/05/2025 0725 9.9  filed at 05/04/2025 1840          Caprini DVT Assessment      Flowsheet Row Pre-Admission Testing from 5/5/2025 in [facility]   DVT Score (IF A SCORE IS NOT CALCULATING, MUST SELECT A BMI TO COMPLETE) 7 filed at 05/05/2025 0823   Medical Factors Swollen legs filed at 05/05/2025 0823   Surgical Factors Major surgery planned, including arthroscopic and laproscopic (1-2 hours) filed at 05/05/2025 0823   BMI (BMI MUST BE CHOSEN) 41-50 (Morbid obesity) filed at 05/05/2025 0823          Modified Frailty Index    No data to display       [id number] Stroke Risk Points  Current as of just now        N/A 0 to 9 Points:      Last Change: N/A          The [id number] risk score (Lip GH, et al. 2009. © 2010 American [employer]) quantifies the risk of stroke for a patient with atrial fibrillation. For patients without atrial fibrillation or under the age of 18 this score appears as N/A. Higher score values generally indicate higher risk of stroke.        This score is not applicable to this patient. Components are not calculated.          Revised Cardiac Risk Index      Flowsheet Row Pre-Admission Testing from 5/5/2025 in [facility]   High-Risk Surgery (Intraperitoneal, Intrathoracic,Suprainguinal vascular) 1 filed  at 05/05/2025 0825   History of ischemic heart disease (History of MI, History of positive exercuse test, Current chest paint considered due to myocardial ischemia, Use of nitrate therapy, ECG with pathological Q Waves) 0 filed at 05/05/2025 0825   History of congestive heart failure (pulmonary edemia, bilateral rales or S3 gallop, Paroxysmal nocturnal dyspnea, CXR showing pulmonary vascular redistribution) 0 filed at 05/05/2025 0825   History of cerebrovascular disease (Prior TIA or stroke) 0 filed at 05/05/2025 0825   Pre-operative insulin treatment 0 filed at 05/05/2025 0825   Pre-operative creatinine>2 mg/dl 0 filed at 05/05/2025 0825   Revised Cardiac Risk Calculator 1 filed at 05/05/2025 0825          Apfel Simplified Score      Flowsheet Row Pre-Admission Testing from 5/5/2025 in [facility]   Smoking status 1 filed at 05/05/2025 0825   History of motion sickness or PONV  0 filed at 05/05/2025 0825   Use of postoperative opioids 1 filed at 05/05/2025 0825   Gender - Female 1=Yes filed at 05/05/2025 0825   Apfel Simplified Score Calculator 3 filed at 05/05/2025 0825          Risk Analysis Index Results This Encounter    No data found in the last 10 encounters.       Stop Bang Score      Flowsheet Row Pre-Admission Testing from 5/5/2025 in [facility]   Do you snore loudly? 1 filed at 05/05/2025 0724   Do you often feel tired or fatigued after your sleep? 0 filed at 05/05/2025 0724   Has anyone ever observed you stop breathing in your sleep? 0 filed at 05/05/2025 0724   Do you have or are you being treated for high blood pressure? 1 filed at 05/05/2025 0724   Recent BMI (Calculated) 46.9 filed at 05/05/2025 0724   Is BMI greater than 35 kg/m2? 1=Yes filed at 05/05/2025 0724   Age older than 50 years old? 0=No filed at 05/05/2025 0724   Is your neck circumference greater than 17 inches (Male) or 16 inches (Female)? 0 filed at 05/05/2025 0724   Gender - Male 0=No filed at 05/05/2025 0724    STOP-BANG Total Score 3 filed at 05/05/2025 0724          Prodigy: High Risk  Total Score: 0          ARISCAT Score for Postoperative Pulmonary Complications    No data to display       Pfeiffer Perioperative Risk for Myocardial Infarction or Cardiac Arrest (TIP)    No data to display         No results found for this or any previous visit (from the past 24 hours).     Assessment & Plan:    40 y.o.  female  scheduled for GASTRECTOMY, SLEEVE, LAPAROSCOPIC on 5/22/25 with Dr. Wolf for  Bariatric surgery status.  PMHX includes HTN, mod CLAYTON on CPAP.  PAT consulted for perioperative risk stratification and optimization    Neuro:  No neurologic diagnosis, however, the patient is at increased risk for perioperative delirium secondary to  age, Patient instructed on and provided cognitive exercises  Patient is at increased risk for perioperative CVA secondary to  HTN, increased age    HEENT:  No HEENT diagnosis or significant findings on chart review or clinical presentation and evaluation. No further preoperative testing/intervention indicated at this time.    Cardiovascular:  Hx HTN, BLE edema on Dyazide daily , Lasix daily as needed    Cardiac Clearance 2/4/25 [facility]  She is low cardiac risk for surgery and may proceed to the OR without further cardiac testing.     METS: 9.9  RCRI: 1 point, 6.0% risk for postoperative MACE   TIP: 0.1% risk for 30 day postoperative MACE  EKG - 11/6/24  Sinus Rhythm Rate 68    Pulmonary:  Hx moderate CLAYTON on CPAP  Stop Bang score is 3 placing patient at intermediate risk for CLAYTON  ARISCAT: <26 points, 1.6% risk of in-hospital postoperative pulmonary complication  PRODIGY: Low risk for opioid induced respiratory depression    Pulmonary education discussed. Patient provided deep breathing exercises and educational handout.    Urological/GYN/Renal  No diagnosis or significant findings on chart review or clinical presentation and evaluation.     Endocrine:  No endocrine diagnosis or  significant findings on chart review or clinical presentation and evaluation. No further testing or intervention is indicated at this time.    Hematologic:  Antiplatelet management   The patient is not currently receiving antiplatelet therapy.  Anticoagulation management  The patient is not currently receiving anticoagulation therapy. Patient provided with DVT educational handout.    Caprini Score 7, patient at high risk for perioperative DVT.  Patient provided with VTE education/handout.    Gastrointestinal:   Bariatric surgery status, Obesity, BMI 46.55  Eat-10 score 0    3/21/25 EGD    Infectious disease:   No infectious diagnosis or significant findings on chart review or clinical presentation and evaluation.       Musculoskeletal:   No diagnosis or significant findings on chart review or clinical presentation and evaluation.     Anesthesia/Airway:  No anesthesia complications      Medication instructions and NPO guidelines reviewed with the patient.  All questions or concerns discussed and addressed.      Labs ordered   CBC  CMP  PT/INR  UA  Nicotine  T&S          [1]   Past Medical History:  Diagnosis Date    Hypertension     Obesity     Sleep apnea     was originally told yes, was using cpap, but was cleared for surgery and has not used for 1 month   [2]   Past Surgical History:  Procedure Laterality Date    UPPER GASTROINTESTINAL ENDOSCOPY      WISDOM TOOTH EXTRACTION     [3]   Family History  Problem Relation Name Age of Onset    Hypertension Mother Pia     Colon polyps Mother Pia     Hypertension Father Aidan     Diabetes Father Aidan     Cancer Mother's Sister Isa     Cancer Maternal Grandmother Heidi    [4]   Allergies  Allergen Reactions    Avocado GI Upset    Ceclor [Cefaclor] Hives and Fever    Codeine Hives and Fever    Sulfa (Sulfonamide Antibiotics) Hives and Fever

## 2025-05-05 NOTE — PREPROCEDURE INSTRUCTIONS
Medication List            Accurate as of May 5, 2025  7:34 AM. Always use your most recent med list.                furosemide 20 mg tablet  Commonly known as: Lasix  Take 1 tablet (20 mg) by mouth once daily as needed (leg edema) for up to 30 doses.  Medication Adjustments for Surgery: Do Not take on the morning of surgery     saccharomyces boulardii 250 mg capsule  Commonly known as: Florastor  Additional Medication Adjustments for Surgery: Take last dose 7 days before surgery     triamterene-hydrochlorothiazid 37.5-25 mg capsule  Commonly known as: Dyazide  Take 1 capsule by mouth once daily.  Medication Adjustments for Surgery: Take/Use as prescribed                                Thank you for visiting Preadmission Testing (PAT) today for your pre-procedure evaluation, you were seen by     EMILY Agrawal  Pre Admission Testing  [facility]  [phone]    This summary includes instructions and information to aid you during your perioperative period.  Please read carefully. If you have any questions about your visit today, please call the number listed above.  If you become ill or have any changes to your health before your surgery, please contact your primary care provider and alert your surgeon.      Preparing for your Surgery       Exercises  Preoperative Deep Breathing Exercises  Why it is important to do deep breathing exercises before my surgery?  Deep breathing exercises strengthen your breathing muscles.  This helps you to recover after your surgery and decreases the chance of breathing complications.  How are the deep breathing exercises done?  Sit straight with your back supported.  Breathe in deeply and slowly through your nose. Your lower rib cage should expand and your abdomen may move forward.  Hold that breath for 3 to 5 seconds.  Breathe out through pursed lips, slowly and completely.  Rest and repeat 10 times every hour while awake.  Rest longer if you become dizzy  or lightheaded.       Preoperative Brain Exercises    What are brain exercises?  A brain exercise is any activity that engages your thinking (cognitive) skills.    What types of activities are considered brain exercises?  Jigsaw puzzles, crossword puzzles, word jumble, memory games, word search, and many more.  Many can be found free online or on your phone via a mobile dickson.    Why should I do brain exercises before my surgery?  More recent research has shown brain exercise before surgery can lower the risk of postoperative delirium (confusion) which can be especially important for older adults.  Patients who did brain exercises for 5 to 10 hours the days before surgery, cut their risk of postoperative delirium in half up to 1 week after surgery.    Sit-to-Stand Exercise    What is the sit-to-stand exercise?  The sit-to-stand exercise strengthens the muscles of your lower body and muscles in the center of your body (core muscles for stability) helping to maintain and improve your strength and mobility.  How do I do the sit-to-stand exercise?  The goal is to do this exercise without using your arms or hands.  If this is too difficult, use your arms and hands or a chair with armrests to help slowly push yourself to the standing position and lower yourself back to the sitting position. As the movement becomes easier use your arms and hands less.    Steps to the sit-to-stand exercise  Sit up tall in a sturdy chair, knees bent, feet flat on the floor shoulder-width apart.  Shift your hips/pelvis forward in the chair to correctly position yourself for the next movement.  Lean forward at your hips.  Stand up straight putting equal weight on both feet.  Check to be sure you are properly aligned with the chair, in a slow controlled movement sit back down.  Repeat this exercise 10-15 times.  If needed you can do it fewer times until your strength improves.  Rest for 1 minute.  Do another 10-15 sit-to-stand exercises.  Try to  do this in the morning and evening.        Instructions    Preoperative Fasting Guidelines    Why must I stop eating and drinking near surgery time?  With sedation, food or liquid in your stomach can enter your lungs causing serious complications  Food can increase nausea and vomiting  When do I need to stop eating and drinking before my surgery?      Per Bariatric Instruction      Simple things you can do to help prevent blood clots     Blood clots are blockages that can form in the body's veins. When a blood clot forms in your deep veins, it may be called a deep vein thrombosis, or DVT for short. Blood clots can happen in any part of the body where blood flows, but they are most common in the arms and legs. If a piece of a blood clot breaks free and travels to the lungs, it is called a pulmonary embolus (PE). A PE can be a very serious problem.         Being in the hospital or having surgery can raise your chances of getting a blood clot because you may not be well enough to move around as much as you normally do.         Ways you can help prevent blood clots in the hospital       Wearing SCDs  SCDs stands for Sequential Compression Devices.   SCDs are special sleeves that wrap around your legs. They attach to a pump that fills them with air to gently squeeze your legs every few minutes.  This helps return the blood in your legs to your heart.   SCDs should only be taken off when walking or bathing. SCDs may not be comfortable, but they can help save your life.              Pump SCD leg sleeves  Wearing compression stockings - if your doctor orders them. These special snug-fitting stockings gently squeeze your legs to help blood flow.       Walking. Walking helps move the blood in your legs.   If your doctor says it is ok, try walking the halls at least   5 times a day. Ask us to help you get up, so you don't fall.      Taking any blood-thinning medicines your doctor orders.              Ways you can help prevent  blood clots at home         Wearing compression stockings - if your doctor orders them.   Walking - to help move the blood in your legs.    Taking any blood-thinning medicines your doctor orders.      Signs of a blood clot or PE    Tell your doctor or nurse right away if you have any of the problems listed below.         If you are at home, seek medical care right away. Call 911 for chest pain or problems breathing.            Signs of a blood clot (DVT) - such as pain, swelling, redness, or warmth in your arm or legs.  Signs of a pulmonary embolism (PE) - such as chest pain or feeling short of breath      Tobacco and Alcohol;  Do not drink alcohol or smoke within 24 hours of surgery.  It is best to quit smoking for as long as possible before any surgery or procedure.      The Week before Surgery        Seven days before Surgery  Check your PAT medication instructions  Do the exercises provided to you by PAT  Arrange for a responsible, adult licensed  to take you home after surgery and stay with you for 24 hours.  You will not be permitted to drive yourself home if you have received any anesthetic/sedation  Six days before surgery  Check your PAT medication instructions  Do the exercises provided to you by PAT  Start using Chlorhexidene (CHG) body wash if prescribed  Five days before surgery  Check your PAT medication instructions  Do the exercises provided to you by PAT  Continue to use CHG body wash if prescribed  Three days before surgery  Check your PAT medication instructions  Do the exercises provided to you by PAT  Continue to use CHG body wash if prescribed  Two days before surgery  Check your PAT medication instructions  Do the exercises provided to you by PAT  Continue to use CHG body wash if prescribed    The Day before Surgery       Check your PAT medication and all other PAT instructions including when to stop eating and drinking  You will be called with your arrival time for surgery in the late  afternoon.  If you do not receive a call please reach out to [facility]. [phone]  Do not smoke or drink 24 hours before surgery  Prepare items to bring with you to the hospital  Shower with your chlorhexidine wash if prescribed  Brush your teeth and use your chlorhexidine dental rinse if prescribed    The Day of Surgery       Check your PAT medication instructions  Ensure you follow the instructions for when to stop eating and drinking  Shower, if prescribed use CHG.  Do not apply any lotions, creams, moisturizers, perfume or deodorant  Brush your teeth and use your CHG dental rinse if prescribed  Wear loose comfortable clothing  Avoid make-up  Remove  jewelry and piercings, consider professional piercing removal with a plastic spacer if needed  Bring photo ID and Insurance card  Bring an accurate medication list that includes medication dose, frequency and allergies  Bring a copy of your advanced directives (will, health care power of )  Bring any devices and controllers as well as medical devices you have been provided with for surgery (CPAP, slings, braces, etc.)  Dentures, eyeglasses, and contacts will be removed before surgery, please bring cases for contacts or glasses

## 2025-05-05 NOTE — H&P (VIEW-ONLY)
BARIATRIC SURGERY PREOPERATIVE VISIT    Date: 25  Time: [unfilled]    Name: Lula Del Angel    MRN: [id number]    This is a 40 y.o. y.o. female with morbid obesity (Body mass index is 46.62 kg/m².) who plans to undergo Laparoscopic sleeve gastrectomy  88261 surgery. They have completed a rigorous preoperative medical work-up and bariatric surgery educational program.   Notes since our last visit, she is active, changing her lifestyle and down 66 lbs and she is eager to move forward.   Making good food choices.     PMH: Problem List[1]    PSH: Surgical History[2]    Social hx:   Social History     Socioeconomic History    Marital status:      Spouse name: Not on file    Number of children: Not on file    Years of education: Not on file    Highest education level: Not on file   Occupational History    Not on file   Tobacco Use    Smoking status: Former     Current packs/day: 0.00     Types: Cigarettes     Quit date: 2017     Years since quittin.4    Smokeless tobacco: Never    Tobacco comments:     Quit in 2017. Smoked about 1/3 pack per day for 15 years   Vaping Use    Vaping status: Never Used   Substance and Sexual Activity    Alcohol use: Not Currently    Drug use: Never    Sexual activity: Not Currently     Partners: Male     Birth control/protection: Abstinence   Other Topics Concern    Not on file   Social History Narrative    Not on file     Social Drivers of Health     Financial Resource Strain: Not on file   Food Insecurity: Not on file   Transportation Needs: Not on file   Physical Activity: Not on file   Stress: Not on file   Social Connections: Not on file   Intimate Partner Violence: Not on file   Housing Stability: Not on file       Initial weight: 326 lbs  Current weight:   Vitals:    25 1040   Weight: 123 kg (271 lb 9.6 oz)       Preop Clearances:  Cardiac: She is low cardiac risk for surgery and may proceed to the OR without further cardiac testing.   Psych: Media 2025:  Bariatric Documentation-Psych clearance  History of Clotting Disorder: NA  Anticoagulation plan: NA  Sleep Study: Moderate Sleep Apnea  Esophagogastroduodenoscopy (EGD)  Order# [id number]  Reading physician: Hilary Wolf MD MPH Ordering provider: Hilary Wolf MD MPH Study date: 3/21/25     Result Information    Status: Final result (Exam End: 3/21/2025 11:26) Provider Status: Open     Result Text       Impression  The esophagus appeared normal.  Erythematous mucosa in the antrum  The duodenum appeared normal.  The cardia appeared normal.        Findings  The esophagus appeared normal.  Regular Z-line 34 cm from the incisors  Erythematous mucosa in the antrum  The duodenum appeared normal.  The cardia appeared normal.       MEDICATIONS:  Prior to Admission Medications:  Current Medications[3]    ALLERGIES:  RX Allergies[4]    REVIEW OF SYSTEMS:  GENERAL: Obese. Negative for malaise, significant weight loss and fever  NECK: Negative for lumps, goiter, pain and significant neck swelling  RESPIRATORY: Negative for cough, wheezing or shortness of breath.  CARDIOVASCULAR: Negative for chest pain, leg swelling or palpitations.  GI: Negative for abdominal discomfort, blood in stools or black stools or change in bowel habits  : No history of dysuria, frequency or incontinence  MUSCULOSKELETAL: Negative for joint pain or swelling, back pain or muscle pain.  SKIN: Negative for lesions, rash, and itching.  PSYCH: Negative for sleep disturbance, mood disorder and recent psychosocial stressors.  ENDOCRINE: Negative for cold or heat intolerance, polyuria, polydipsia and goiter.    PHYSICAL EXAM:  Visit Vitals  /84   Pulse 62   Resp 18       General appearance: obese  Skin: warm, no erythema or rashes  Lungs: clear to percussion and auscultation  Heart: regular rhythm and S1, S2 normal  Abdomen: soft, non-tender, no masses, no organomegaly  Extremities: Normal exam of the extremities. No swelling or pain.    Results for  orders placed or performed in visit on 04/18/25 (from the past 24 hours)   Protime-INR   Result Value Ref Range    Protime 12.2 9.8 - 12.4 seconds    INR 1.1 0.9 - 1.1   CBC and Auto Differential   Result Value Ref Range    WBC 5.8 4.4 - 11.3 x10*3/uL    nRBC 0.0 0.0 - 0.0 /100 WBCs    RBC 4.57 4.00 - 5.20 x10*6/uL    Hemoglobin 13.5 12.0 - 16.0 g/dL    Hematocrit 41.0 36.0 - 46.0 %    MCV 90 80 - 100 fL    MCH 29.5 26.0 - 34.0 pg    MCHC 32.9 32.0 - 36.0 g/dL    RDW 14.4 11.5 - 14.5 %    Platelets 253 150 - 450 x10*3/uL    Neutrophils % 66.5 40.0 - 80.0 %    Immature Granulocytes %, Automated 0.2 0.0 - 0.9 %    Lymphocytes % 21.6 13.0 - 44.0 %    Monocytes % 6.4 2.0 - 10.0 %    Eosinophils % 5.0 0.0 - 6.0 %    Basophils % 0.3 0.0 - 2.0 %    Neutrophils Absolute 3.85 1.20 - 7.70 x10*3/uL    Immature Granulocytes Absolute, Automated 0.01 0.00 - 0.70 x10*3/uL    Lymphocytes Absolute 1.25 1.20 - 4.80 x10*3/uL    Monocytes Absolute 0.37 0.10 - 1.00 x10*3/uL    Eosinophils Absolute 0.29 0.00 - 0.70 x10*3/uL    Basophils Absolute 0.02 0.00 - 0.10 x10*3/uL   Comprehensive Metabolic Panel   Result Value Ref Range    Glucose 92 74 - 99 mg/dL    Sodium 140 136 - 145 mmol/L    Potassium 4.4 3.5 - 5.3 mmol/L    Chloride 105 98 - 107 mmol/L    Bicarbonate 29 21 - 32 mmol/L    Anion Gap 10 10 - 20 mmol/L    Urea Nitrogen 19 6 - 23 mg/dL    Creatinine 0.74 0.50 - 1.05 mg/dL    eGFR >90 >60 mL/min/1.73m*2    Calcium 8.8 8.6 - 10.3 mg/dL    Albumin 3.7 3.4 - 5.0 g/dL    Alkaline Phosphatase 47 33 - 110 U/L    Total Protein 6.6 6.4 - 8.2 g/dL    AST 12 9 - 39 U/L    Bilirubin, Total 0.6 0.0 - 1.2 mg/dL    ALT 18 7 - 45 U/L   Type And Screen Is this order related to pregnancy or an upcoming surgery? No   Result Value Ref Range    ABO TYPE AB     Rh TYPE POS     ANTIBODY SCREEN NEG    Urinalysis with Reflex Culture and Microscopic   Result Value Ref Range    Color, Urine Yellow Light-Yellow, Yellow, Dark-Yellow    Appearance, Urine  Clear Clear    Specific Gravity, Urine 1.035 1.005 - 1.035    pH, Urine 5.5 5.0, 5.5, 6.0, 6.5, 7.0, 7.5, 8.0    Protein, Urine NEGATIVE NEGATIVE, 10 (TRACE), 20 (TRACE) mg/dL    Glucose, Urine Normal Normal mg/dL    Blood, Urine 0.06 (1+) (A) NEGATIVE mg/dL    Ketones, Urine NEGATIVE NEGATIVE mg/dL    Bilirubin, Urine NEGATIVE NEGATIVE mg/dL    Urobilinogen, Urine Normal Normal mg/dL    Nitrite, Urine NEGATIVE NEGATIVE    Leukocyte Esterase, Urine NEGATIVE NEGATIVE   Urinalysis Microscopic   Result Value Ref Range    WBC, Urine 1-5 1-5, NONE /HPF    WBC Clumps, Urine RARE Reference range not established. /HPF    RBC, Urine 1-2 NONE, 1-2, 3-5 /HPF    Squamous Epithelial Cells, Urine 1-9 (SPARSE) Reference range not established. /HPF    Mucus, Urine FEW Reference range not established. /LPF       IMPRESSION:  Lula Del Angel is a 40 y.o. y.o. female with a BMI of Body mass index is 46.62 kg/m².. : htn and can benefits from weight loss.  We discussed the sleeve at length.  She is a great candidate.  She has a supportive  who also had gastric bypass.  We discussed focused exercise. She is low risk for surgery.  She has never had surgery.   When I first met her, bmi was 54.  She has made multiple lifestyle changes and is eager to proceed.  She has already lost 66 lbs.  She is on 2 dieuretics.  Uses lasix only prn.    The risks of Laparoscopic sleeve gastrectomy  44156 surgery including bleeding, leak, wound infection, dehydration, ulcers, internal hernia, DVT/PE, prolonged nausea/vomiting, incomplete resolution of associated medical conditions, reflux, weight regain, vitamin/mineral deficiencies, and death have been explained to the patient and Lula Del Angel has expressed understanding and acceptance of them.    The benefits of the above surgery including weight loss, improvement/resolution of associated medical and mental health conditions, improved mobility, and decreased mortality have been explained the the  patient and Lula Del Angel has expressed understanding and acceptance of them.  The patient understands the risks and benefits of the procedure and how the procedure is performed. The patient understands the risks include but are not limited to bleeding, infection, DVT, PE, pneumonia, myocardial infarction, leak along the staple lines, and weight regain. We discussed lifestyle changes necessary to be successful.   This patient does meet the criteria for a surgical weight loss procedure according to NIH guidelines.  The risks of sleeve gastrectomy, Asya-en-Y gastric bypass, and duodenal switch surgery including bleeding, leak, wound infection, dehydration, ulcers, internal hernia, DVT/PE, prolonged nausea/vomiting, incomplete resolution of associated medical conditions, reflux, weight regain, vitamin/mineral deficiencies, and death have been explained to the patient and has expressed understanding and acceptance of them.   The increased risk of substance and alcohol abuse following bariatric surgery was discussed with the patient, along with the negative consequences of substance/alcohol use after surgery including addiction, worsening of mental health disorders, and injury to the stomach. The risk of smoking and vaping (tobacco or any other substance) after bariatric surgery was explained to the patient. This includes risk of anastamotic ulcers, gastritis, bleeding, perforation, stricture, and PO intolerance.  The patient expressed understanding and acceptance of these risks.   They have been preoperatively evaluated and deemed to be an appropriate candidate for bariatric surgery.  Surgery Type: Laparoscopic sleeve gastrectomy  58137    All testing reviewed.  All clearances contained.    PLAN:  You are scheduled for a sleeve on     You will receive a phone call the day prior to surgery with your OR arrival time.    Be sure to read over your Pre-Op book for final preparation for surgery.    Make a shopping list and   your supplements prior to surgery.    You have been provided with a pain script today- TAKE THIS TODAY to your pharmacy as it may need prior authorization.    Electronic prescriptions were sent to your pharmacy. Prescriptions for Omeprazole (anacid), Enoxaparin (DVT Prophylaxis) and Ondansetron (anti-nausea) have been sent to your retail pharmacy. You will likely get these medications in the hospital.  If not you can Pick these up after surgery.    Be sure to take the omeprazole, open the capsule and sprinkle over SF applesauce and take every day for 6 months after surgery. DO NOT MISS A DOSE. This will help you to heal.    Call with any questions! [phone] gloria Richardson    1) Reread your pre op manual to be familiar with pre op and post op expectations    2) Follow the pre op diet exactly so your liver shrinks down prior to your surgery:     -DAY BEFORE SURGERY: NO SOLID FOODS! You may have up to 3 protein shakes BEFORE 3pm. After 3pm, you may only have CLEAR, non-caloric liquids w/ NO red dye.   -DAY OF SURGERY: Do not eat or drink anything unless specified by the doctor.    3) Obtain the over the counter items you need in the home prior to your surgery    4) Be certain you have an appointment with your medical doctors who will need to tweak your prescription medications after surgery as you drop weight rapidly.      5) Make sure you have follow up appointments 2 weeks after surgery and at the 6 week [facility]    6) --Family Medical Leave paperwork is available through your human resources department. Please call your human resources department to obtain the paperwork. You must allow up to two weeks for paperwork to be completed. You will need to fill out a portion of the paperwork before faxing it to our office for completion. In addition, indicate what kind of leave you are requesting (intermittent or continuous) and how long you are requesting the leave extend. Please fax the paperwork to [phone]    Operative and blood transfusion consent forms were signed by the patient and witnessed today.    Prescriptions for all required post-operative home medications were sent to the [facility] pharmacy today and will be delivered to the patient's bedside on the day of discharge.    Further education was provided on day of surgery instructions and what to expect from the inpatient admission after surgery.          [1]   Patient Active Problem List  Diagnosis    Essential (primary) hypertension    Obesity, morbid, BMI 50 or higher (Multi)    Bilateral leg edema    CL[place] (obstructive sleep apnea)    Bariatric surgery status   [2]   Past Surgical History:  Procedure Laterality Date    UPPER GASTROINTESTINAL ENDOSCOPY      WISDOM TOOTH EXTRACTION     [3]   Current Outpatient Medications:     furosemide (Lasix) 20 mg tablet, Take 1 tablet (20 mg) by mouth once daily as needed (leg edema) for up to 30 doses., Disp: 30 tablet, Rfl: 0    saccharomyces boulardii (Florastor) 250 mg capsule, Take 1 capsule (250 mg) by mouth 2 times a day. Surebounty 4 in 1 feminine probiotic, Disp: , Rfl:     triamterene-hydrochlorothiazid (Dyazide) 37.5-25 mg capsule, Take 1 capsule by mouth once daily., Disp: 90 capsule, Rfl: 1  [4]   Allergies  Allergen Reactions    Avocado GI Upset    Ceclor [Cefaclor] Hives and Fever    Codeine Hives and Fever    Sulfa (Sulfonamide Antibiotics) Hives and Fever

## 2025-05-05 NOTE — PROGRESS NOTES
BARIATRIC SURGERY PREOPERATIVE VISIT    Date: 25  Time: [unfilled]    Name: Lula Del Angel    MRN: [id number]    This is a 40 y.o. y.o. female with morbid obesity (Body mass index is 46.62 kg/m².) who plans to undergo Laparoscopic sleeve gastrectomy  42335 surgery. They have completed a rigorous preoperative medical work-up and bariatric surgery educational program.   Notes since our last visit, she is active, changing her lifestyle and down 66 lbs and she is eager to move forward.   Making good food choices.     PMH: Problem List[1]    PSH: Surgical History[2]    Social hx:   Social History     Socioeconomic History    Marital status:      Spouse name: Not on file    Number of children: Not on file    Years of education: Not on file    Highest education level: Not on file   Occupational History    Not on file   Tobacco Use    Smoking status: Former     Current packs/day: 0.00     Types: Cigarettes     Quit date: 2017     Years since quittin.4    Smokeless tobacco: Never    Tobacco comments:     Quit in 2017. Smoked about 1/3 pack per day for 15 years   Vaping Use    Vaping status: Never Used   Substance and Sexual Activity    Alcohol use: Not Currently    Drug use: Never    Sexual activity: Not Currently     Partners: Male     Birth control/protection: Abstinence   Other Topics Concern    Not on file   Social History Narrative    Not on file     Social Drivers of Health     Financial Resource Strain: Not on file   Food Insecurity: Not on file   Transportation Needs: Not on file   Physical Activity: Not on file   Stress: Not on file   Social Connections: Not on file   Intimate Partner Violence: Not on file   Housing Stability: Not on file       Initial weight: 326 lbs  Current weight:   Vitals:    25 1040   Weight: 123 kg (271 lb 9.6 oz)       Preop Clearances:  Cardiac: She is low cardiac risk for surgery and may proceed to the OR without further cardiac testing.   Psych: Media 2025:  Bariatric Documentation-Psych clearance  History of Clotting Disorder: NA  Anticoagulation plan: NA  Sleep Study: Moderate Sleep Apnea  Esophagogastroduodenoscopy (EGD)  Order# [id number]  Reading physician: Hilary Wolf MD MPH Ordering provider: Hilary Wolf MD MPH Study date: 3/21/25     Result Information    Status: Final result (Exam End: 3/21/2025 11:26) Provider Status: Open     Result Text       Impression  The esophagus appeared normal.  Erythematous mucosa in the antrum  The duodenum appeared normal.  The cardia appeared normal.        Findings  The esophagus appeared normal.  Regular Z-line 34 cm from the incisors  Erythematous mucosa in the antrum  The duodenum appeared normal.  The cardia appeared normal.       MEDICATIONS:  Prior to Admission Medications:  Current Medications[3]    ALLERGIES:  RX Allergies[4]    REVIEW OF SYSTEMS:  GENERAL: Obese. Negative for malaise, significant weight loss and fever  NECK: Negative for lumps, goiter, pain and significant neck swelling  RESPIRATORY: Negative for cough, wheezing or shortness of breath.  CARDIOVASCULAR: Negative for chest pain, leg swelling or palpitations.  GI: Negative for abdominal discomfort, blood in stools or black stools or change in bowel habits  : No history of dysuria, frequency or incontinence  MUSCULOSKELETAL: Negative for joint pain or swelling, back pain or muscle pain.  SKIN: Negative for lesions, rash, and itching.  PSYCH: Negative for sleep disturbance, mood disorder and recent psychosocial stressors.  ENDOCRINE: Negative for cold or heat intolerance, polyuria, polydipsia and goiter.    PHYSICAL EXAM:  Visit Vitals  /84   Pulse 62   Resp 18       General appearance: obese  Skin: warm, no erythema or rashes  Lungs: clear to percussion and auscultation  Heart: regular rhythm and S1, S2 normal  Abdomen: soft, non-tender, no masses, no organomegaly  Extremities: Normal exam of the extremities. No swelling or pain.    Results for  orders placed or performed in visit on 04/18/25 (from the past 24 hours)   Protime-INR   Result Value Ref Range    Protime 12.2 9.8 - 12.4 seconds    INR 1.1 0.9 - 1.1   CBC and Auto Differential   Result Value Ref Range    WBC 5.8 4.4 - 11.3 x10*3/uL    nRBC 0.0 0.0 - 0.0 /100 WBCs    RBC 4.57 4.00 - 5.20 x10*6/uL    Hemoglobin 13.5 12.0 - 16.0 g/dL    Hematocrit 41.0 36.0 - 46.0 %    MCV 90 80 - 100 fL    MCH 29.5 26.0 - 34.0 pg    MCHC 32.9 32.0 - 36.0 g/dL    RDW 14.4 11.5 - 14.5 %    Platelets 253 150 - 450 x10*3/uL    Neutrophils % 66.5 40.0 - 80.0 %    Immature Granulocytes %, Automated 0.2 0.0 - 0.9 %    Lymphocytes % 21.6 13.0 - 44.0 %    Monocytes % 6.4 2.0 - 10.0 %    Eosinophils % 5.0 0.0 - 6.0 %    Basophils % 0.3 0.0 - 2.0 %    Neutrophils Absolute 3.85 1.20 - 7.70 x10*3/uL    Immature Granulocytes Absolute, Automated 0.01 0.00 - 0.70 x10*3/uL    Lymphocytes Absolute 1.25 1.20 - 4.80 x10*3/uL    Monocytes Absolute 0.37 0.10 - 1.00 x10*3/uL    Eosinophils Absolute 0.29 0.00 - 0.70 x10*3/uL    Basophils Absolute 0.02 0.00 - 0.10 x10*3/uL   Comprehensive Metabolic Panel   Result Value Ref Range    Glucose 92 74 - 99 mg/dL    Sodium 140 136 - 145 mmol/L    Potassium 4.4 3.5 - 5.3 mmol/L    Chloride 105 98 - 107 mmol/L    Bicarbonate 29 21 - 32 mmol/L    Anion Gap 10 10 - 20 mmol/L    Urea Nitrogen 19 6 - 23 mg/dL    Creatinine 0.74 0.50 - 1.05 mg/dL    eGFR >90 >60 mL/min/1.73m*2    Calcium 8.8 8.6 - 10.3 mg/dL    Albumin 3.7 3.4 - 5.0 g/dL    Alkaline Phosphatase 47 33 - 110 U/L    Total Protein 6.6 6.4 - 8.2 g/dL    AST 12 9 - 39 U/L    Bilirubin, Total 0.6 0.0 - 1.2 mg/dL    ALT 18 7 - 45 U/L   Type And Screen Is this order related to pregnancy or an upcoming surgery? No   Result Value Ref Range    ABO TYPE AB     Rh TYPE POS     ANTIBODY SCREEN NEG    Urinalysis with Reflex Culture and Microscopic   Result Value Ref Range    Color, Urine Yellow Light-Yellow, Yellow, Dark-Yellow    Appearance, Urine  Clear Clear    Specific Gravity, Urine 1.035 1.005 - 1.035    pH, Urine 5.5 5.0, 5.5, 6.0, 6.5, 7.0, 7.5, 8.0    Protein, Urine NEGATIVE NEGATIVE, 10 (TRACE), 20 (TRACE) mg/dL    Glucose, Urine Normal Normal mg/dL    Blood, Urine 0.06 (1+) (A) NEGATIVE mg/dL    Ketones, Urine NEGATIVE NEGATIVE mg/dL    Bilirubin, Urine NEGATIVE NEGATIVE mg/dL    Urobilinogen, Urine Normal Normal mg/dL    Nitrite, Urine NEGATIVE NEGATIVE    Leukocyte Esterase, Urine NEGATIVE NEGATIVE   Urinalysis Microscopic   Result Value Ref Range    WBC, Urine 1-5 1-5, NONE /HPF    WBC Clumps, Urine RARE Reference range not established. /HPF    RBC, Urine 1-2 NONE, 1-2, 3-5 /HPF    Squamous Epithelial Cells, Urine 1-9 (SPARSE) Reference range not established. /HPF    Mucus, Urine FEW Reference range not established. /LPF       IMPRESSION:  Lula Del Angel is a 40 y.o. y.o. female with a BMI of Body mass index is 46.62 kg/m².. : htn and can benefits from weight loss.  We discussed the sleeve at length.  She is a great candidate.  She has a supportive  who also had gastric bypass.  We discussed focused exercise. She is low risk for surgery.  She has never had surgery.   When I first met her, bmi was 54.  She has made multiple lifestyle changes and is eager to proceed.  She has already lost 66 lbs.  She is on 2 dieuretics.  Uses lasix only prn.    The risks of Laparoscopic sleeve gastrectomy  66012 surgery including bleeding, leak, wound infection, dehydration, ulcers, internal hernia, DVT/PE, prolonged nausea/vomiting, incomplete resolution of associated medical conditions, reflux, weight regain, vitamin/mineral deficiencies, and death have been explained to the patient and Lula Del Angel has expressed understanding and acceptance of them.    The benefits of the above surgery including weight loss, improvement/resolution of associated medical and mental health conditions, improved mobility, and decreased mortality have been explained the the  patient and Lula Del Angel has expressed understanding and acceptance of them.  The patient understands the risks and benefits of the procedure and how the procedure is performed. The patient understands the risks include but are not limited to bleeding, infection, DVT, PE, pneumonia, myocardial infarction, leak along the staple lines, and weight regain. We discussed lifestyle changes necessary to be successful.   This patient does meet the criteria for a surgical weight loss procedure according to NIH guidelines.  The risks of sleeve gastrectomy, Asya-en-Y gastric bypass, and duodenal switch surgery including bleeding, leak, wound infection, dehydration, ulcers, internal hernia, DVT/PE, prolonged nausea/vomiting, incomplete resolution of associated medical conditions, reflux, weight regain, vitamin/mineral deficiencies, and death have been explained to the patient and has expressed understanding and acceptance of them.   The increased risk of substance and alcohol abuse following bariatric surgery was discussed with the patient, along with the negative consequences of substance/alcohol use after surgery including addiction, worsening of mental health disorders, and injury to the stomach. The risk of smoking and vaping (tobacco or any other substance) after bariatric surgery was explained to the patient. This includes risk of anastamotic ulcers, gastritis, bleeding, perforation, stricture, and PO intolerance.  The patient expressed understanding and acceptance of these risks.   They have been preoperatively evaluated and deemed to be an appropriate candidate for bariatric surgery.  Surgery Type: Laparoscopic sleeve gastrectomy  96446    All testing reviewed.  All clearances contained.    PLAN:  You are scheduled for a sleeve on     You will receive a phone call the day prior to surgery with your OR arrival time.    Be sure to read over your Pre-Op book for final preparation for surgery.    Make a shopping list and   your supplements prior to surgery.    You have been provided with a pain script today- TAKE THIS TODAY to your pharmacy as it may need prior authorization.    Electronic prescriptions were sent to your pharmacy. Prescriptions for Omeprazole (anacid), Enoxaparin (DVT Prophylaxis) and Ondansetron (anti-nausea) have been sent to your retail pharmacy. You will likely get these medications in the hospital.  If not you can Pick these up after surgery.    Be sure to take the omeprazole, open the capsule and sprinkle over SF applesauce and take every day for 6 months after surgery. DO NOT MISS A DOSE. This will help you to heal.    Call with any questions! [phone] gloria Richardson    1) Reread your pre op manual to be familiar with pre op and post op expectations    2) Follow the pre op diet exactly so your liver shrinks down prior to your surgery:     -DAY BEFORE SURGERY: NO SOLID FOODS! You may have up to 3 protein shakes BEFORE 3pm. After 3pm, you may only have CLEAR, non-caloric liquids w/ NO red dye.   -DAY OF SURGERY: Do not eat or drink anything unless specified by the doctor.    3) Obtain the over the counter items you need in the home prior to your surgery    4) Be certain you have an appointment with your medical doctors who will need to tweak your prescription medications after surgery as you drop weight rapidly.      5) Make sure you have follow up appointments 2 weeks after surgery and at the 6 week elliot    6) --Family Medical Leave paperwork is available through your human resources department. Please call your human resources department to obtain the paperwork. You must allow up to two weeks for paperwork to be completed. You will need to fill out a portion of the paperwork before faxing it to our office for completion. In addition, indicate what kind of leave you are requesting (intermittent or continuous) and how long you are requesting the leave extend. Please fax the paperwork to [phone]    Operative and blood transfusion consent forms were signed by the patient and witnessed today.    Prescriptions for all required post-operative home medications were sent to the [facility] pharmacy today and will be delivered to the patient's bedside on the day of discharge.    Further education was provided on day of surgery instructions and what to expect from the inpatient admission after surgery.          [1]   Patient Active Problem List  Diagnosis    Essential (primary) hypertension    Obesity, morbid, BMI 50 or higher (Multi)    Bilateral leg edema    CLAYTON (obstructive sleep apnea)    Bariatric surgery status   [2]   Past Surgical History:  Procedure Laterality Date    UPPER GASTROINTESTINAL ENDOSCOPY      WISDOM TOOTH EXTRACTION     [3]   Current Outpatient Medications:     furosemide (Lasix) 20 mg tablet, Take 1 tablet (20 mg) by mouth once daily as needed (leg edema) for up to 30 doses., Disp: 30 tablet, Rfl: 0    saccharomyces boulardii (Florastor) 250 mg capsule, Take 1 capsule (250 mg) by mouth 2 times a day. Surebounty 4 in 1 feminine probiotic, Disp: , Rfl:     triamterene-hydrochlorothiazid (Dyazide) 37.5-25 mg capsule, Take 1 capsule by mouth once daily., Disp: 90 capsule, Rfl: 1  [4]   Allergies  Allergen Reactions    Avocado GI Upset    Ceclor [Cefaclor] Hives and Fever    Codeine Hives and Fever    Sulfa (Sulfonamide Antibiotics) Hives and Fever

## 2025-05-10 LAB
ANABASINE UR-MCNC: <5 NG/ML
COTININE UR-MCNC: <15 NG/ML
NICOTINE UR-MCNC: <15 NG/ML
TRANS-3-OH-COTININE UR-MCNC: <50 NG/ML

## 2025-05-21 ENCOUNTER — ANESTHESIA EVENT (OUTPATIENT)
Dept: OPERATING ROOM | Facility: HOSPITAL | Age: 40
End: 2025-05-21
Payer: COMMERCIAL

## 2025-05-21 PROBLEM — E66.01 MORBID (SEVERE) OBESITY DUE TO EXCESS CALORIES (MULTI): Status: ACTIVE | Noted: 2025-05-21

## 2025-05-22 ENCOUNTER — ANESTHESIA (OUTPATIENT)
Dept: OPERATING ROOM | Facility: HOSPITAL | Age: 40
End: 2025-05-22
Payer: COMMERCIAL

## 2025-05-22 ENCOUNTER — HOSPITAL ENCOUNTER (INPATIENT)
Facility: HOSPITAL | Age: 40
DRG: 621 | End: 2025-05-22
Attending: SURGERY | Admitting: SURGERY
Payer: COMMERCIAL

## 2025-05-22 DIAGNOSIS — G89.18 POST-OPERATIVE PAIN: ICD-10-CM

## 2025-05-22 DIAGNOSIS — E66.01 MORBID (SEVERE) OBESITY DUE TO EXCESS CALORIES (MULTI): Primary | ICD-10-CM

## 2025-05-22 DIAGNOSIS — R60.0 LOCALIZED EDEMA: ICD-10-CM

## 2025-05-22 DIAGNOSIS — Z98.84 BARIATRIC SURGERY STATUS: ICD-10-CM

## 2025-05-22 LAB
ABO GROUP (TYPE) IN BLOOD: NORMAL
PREGNANCY TEST URINE, POC: NEGATIVE
RH FACTOR (ANTIGEN D): NORMAL

## 2025-05-22 PROCEDURE — 7100000002 HC RECOVERY ROOM TIME - EACH INCREMENTAL 1 MINUTE: Performed by: SURGERY

## 2025-05-22 PROCEDURE — 2500000005 HC RX 250 GENERAL PHARMACY W/O HCPCS: Performed by: SURGERY

## 2025-05-22 PROCEDURE — 2500000002 HC RX 250 W HCPCS SELF ADMINISTERED DRUGS (ALT 637 FOR MEDICARE OP, ALT 636 FOR OP/ED): Performed by: STUDENT IN AN ORGANIZED HEALTH CARE EDUCATION/TRAINING PROGRAM

## 2025-05-22 PROCEDURE — 2780000003 HC OR 278 NO HCPCS: Performed by: SURGERY

## 2025-05-22 PROCEDURE — C1889 IMPLANT/INSERT DEVICE, NOC: HCPCS | Performed by: SURGERY

## 2025-05-22 PROCEDURE — 2500000001 HC RX 250 WO HCPCS SELF ADMINISTERED DRUGS (ALT 637 FOR MEDICARE OP): Performed by: STUDENT IN AN ORGANIZED HEALTH CARE EDUCATION/TRAINING PROGRAM

## 2025-05-22 PROCEDURE — 3700000002 HC GENERAL ANESTHESIA TIME - EACH INCREMENTAL 1 MINUTE: Performed by: SURGERY

## 2025-05-22 PROCEDURE — 3600000009 HC OR TIME - EACH INCREMENTAL 1 MINUTE - PROCEDURE LEVEL FOUR: Performed by: SURGERY

## 2025-05-22 PROCEDURE — 2500000004 HC RX 250 GENERAL PHARMACY W/ HCPCS (ALT 636 FOR OP/ED): Mod: JZ | Performed by: SURGERY

## 2025-05-22 PROCEDURE — A43775 PR LAP, GAST RESTRICT PROC, LONGITUDINAL GASTRECTOMY: Performed by: ANESTHESIOLOGY

## 2025-05-22 PROCEDURE — 43775 LAP SLEEVE GASTRECTOMY: CPT | Performed by: STUDENT IN AN ORGANIZED HEALTH CARE EDUCATION/TRAINING PROGRAM

## 2025-05-22 PROCEDURE — 9420000001 HC RT PATIENT EDUCATION 5 MIN

## 2025-05-22 PROCEDURE — 2500000004 HC RX 250 GENERAL PHARMACY W/ HCPCS (ALT 636 FOR OP/ED): Performed by: NURSE ANESTHETIST, CERTIFIED REGISTERED

## 2025-05-22 PROCEDURE — 2500000004 HC RX 250 GENERAL PHARMACY W/ HCPCS (ALT 636 FOR OP/ED): Mod: JZ | Performed by: ANESTHESIOLOGY

## 2025-05-22 PROCEDURE — 81025 URINE PREGNANCY TEST: CPT | Performed by: ANESTHESIOLOGY

## 2025-05-22 PROCEDURE — 0DB64Z3 EXCISION OF STOMACH, PERCUTANEOUS ENDOSCOPIC APPROACH, VERTICAL: ICD-10-PCS | Performed by: SURGERY

## 2025-05-22 PROCEDURE — 36415 COLL VENOUS BLD VENIPUNCTURE: CPT | Performed by: SURGERY

## 2025-05-22 PROCEDURE — 2720000007 HC OR 272 NO HCPCS: Performed by: SURGERY

## 2025-05-22 PROCEDURE — 43775 LAP SLEEVE GASTRECTOMY: CPT | Performed by: SURGERY

## 2025-05-22 PROCEDURE — 3600000004 HC OR TIME - INITIAL BASE CHARGE - PROCEDURE LEVEL FOUR: Performed by: SURGERY

## 2025-05-22 PROCEDURE — 7100000011 HC EXTENDED STAY RECOVERY HOURLY - NURSING UNIT

## 2025-05-22 PROCEDURE — 3700000001 HC GENERAL ANESTHESIA TIME - INITIAL BASE CHARGE: Performed by: SURGERY

## 2025-05-22 PROCEDURE — 94760 N-INVAS EAR/PLS OXIMETRY 1: CPT

## 2025-05-22 PROCEDURE — A43775 PR LAP, GAST RESTRICT PROC, LONGITUDINAL GASTRECTOMY: Performed by: NURSE ANESTHETIST, CERTIFIED REGISTERED

## 2025-05-22 PROCEDURE — 2500000004 HC RX 250 GENERAL PHARMACY W/ HCPCS (ALT 636 FOR OP/ED): Mod: JZ | Performed by: STUDENT IN AN ORGANIZED HEALTH CARE EDUCATION/TRAINING PROGRAM

## 2025-05-22 PROCEDURE — 7100000001 HC RECOVERY ROOM TIME - INITIAL BASE CHARGE: Performed by: SURGERY

## 2025-05-22 RX ORDER — ONDANSETRON HYDROCHLORIDE 2 MG/ML
4 INJECTION, SOLUTION INTRAVENOUS ONCE AS NEEDED
Status: COMPLETED | OUTPATIENT
Start: 2025-05-22 | End: 2025-05-22

## 2025-05-22 RX ORDER — NALOXONE HYDROCHLORIDE 0.4 MG/ML
0.2 INJECTION, SOLUTION INTRAMUSCULAR; INTRAVENOUS; SUBCUTANEOUS EVERY 5 MIN PRN
Status: DISCONTINUED | OUTPATIENT
Start: 2025-05-22 | End: 2025-05-26 | Stop reason: HOSPADM

## 2025-05-22 RX ORDER — SODIUM CHLORIDE 0.9 G/100ML
INJECTION, SOLUTION IRRIGATION AS NEEDED
Status: DISCONTINUED | OUTPATIENT
Start: 2025-05-22 | End: 2025-05-22 | Stop reason: HOSPADM

## 2025-05-22 RX ORDER — APREPITANT 40 MG/1
40 CAPSULE ORAL ONCE
Status: DISCONTINUED | OUTPATIENT
Start: 2025-05-22 | End: 2025-05-22 | Stop reason: HOSPADM

## 2025-05-22 RX ORDER — OXYCODONE HYDROCHLORIDE 5 MG/1
5 TABLET ORAL EVERY 4 HOURS PRN
Status: DISCONTINUED | OUTPATIENT
Start: 2025-05-22 | End: 2025-05-22 | Stop reason: HOSPADM

## 2025-05-22 RX ORDER — HEPARIN SODIUM 5000 [USP'U]/ML
5000 INJECTION, SOLUTION INTRAVENOUS; SUBCUTANEOUS EVERY 8 HOURS SCHEDULED
Status: DISCONTINUED | OUTPATIENT
Start: 2025-05-23 | End: 2025-05-26 | Stop reason: HOSPADM

## 2025-05-22 RX ORDER — ESOMEPRAZOLE MAGNESIUM 40 MG/1
40 GRANULE, DELAYED RELEASE ORAL
Status: DISCONTINUED | OUTPATIENT
Start: 2025-05-23 | End: 2025-05-26 | Stop reason: HOSPADM

## 2025-05-22 RX ORDER — ONDANSETRON 4 MG/1
4 TABLET, ORALLY DISINTEGRATING ORAL EVERY 8 HOURS PRN
Status: DISCONTINUED | OUTPATIENT
Start: 2025-05-22 | End: 2025-05-26 | Stop reason: HOSPADM

## 2025-05-22 RX ORDER — DIPHENHYDRAMINE HYDROCHLORIDE 50 MG/ML
12.5 INJECTION, SOLUTION INTRAMUSCULAR; INTRAVENOUS ONCE AS NEEDED
Status: DISCONTINUED | OUTPATIENT
Start: 2025-05-22 | End: 2025-05-22 | Stop reason: HOSPADM

## 2025-05-22 RX ORDER — SCOPOLAMINE 1 MG/3D
1 PATCH, EXTENDED RELEASE TRANSDERMAL ONCE
Status: COMPLETED | OUTPATIENT
Start: 2025-05-22 | End: 2025-05-25

## 2025-05-22 RX ORDER — SODIUM CHLORIDE, SODIUM LACTATE, POTASSIUM CHLORIDE, CALCIUM CHLORIDE 600; 310; 30; 20 MG/100ML; MG/100ML; MG/100ML; MG/100ML
150 INJECTION, SOLUTION INTRAVENOUS CONTINUOUS
Status: ACTIVE | OUTPATIENT
Start: 2025-05-22 | End: 2025-05-23

## 2025-05-22 RX ORDER — SUCCINYLCHOLINE CHLORIDE 20 MG/ML
INJECTION INTRAMUSCULAR; INTRAVENOUS AS NEEDED
Status: DISCONTINUED | OUTPATIENT
Start: 2025-05-22 | End: 2025-05-22

## 2025-05-22 RX ORDER — MIDAZOLAM HYDROCHLORIDE 1 MG/ML
INJECTION, SOLUTION INTRAMUSCULAR; INTRAVENOUS AS NEEDED
Status: DISCONTINUED | OUTPATIENT
Start: 2025-05-22 | End: 2025-05-22

## 2025-05-22 RX ORDER — ALBUTEROL SULFATE 0.83 MG/ML
2.5 SOLUTION RESPIRATORY (INHALATION) ONCE AS NEEDED
Status: DISCONTINUED | OUTPATIENT
Start: 2025-05-22 | End: 2025-05-22 | Stop reason: HOSPADM

## 2025-05-22 RX ORDER — METOCLOPRAMIDE 5 MG/1
10 TABLET ORAL EVERY 6 HOURS PRN
Status: DISCONTINUED | OUTPATIENT
Start: 2025-05-22 | End: 2025-05-22

## 2025-05-22 RX ORDER — HYDRALAZINE HYDROCHLORIDE 20 MG/ML
5 INJECTION INTRAMUSCULAR; INTRAVENOUS EVERY 4 HOURS PRN
Status: DISCONTINUED | OUTPATIENT
Start: 2025-05-22 | End: 2025-05-24

## 2025-05-22 RX ORDER — SCOPOLAMINE 1 MG/3D
1 PATCH, EXTENDED RELEASE TRANSDERMAL ONCE
Status: DISCONTINUED | OUTPATIENT
Start: 2025-05-22 | End: 2025-05-22 | Stop reason: HOSPADM

## 2025-05-22 RX ORDER — PANTOPRAZOLE SODIUM 40 MG/10ML
40 INJECTION, POWDER, LYOPHILIZED, FOR SOLUTION INTRAVENOUS
Status: DISCONTINUED | OUTPATIENT
Start: 2025-05-23 | End: 2025-05-26 | Stop reason: HOSPADM

## 2025-05-22 RX ORDER — KETOROLAC TROMETHAMINE 15 MG/ML
15 INJECTION, SOLUTION INTRAMUSCULAR; INTRAVENOUS EVERY 6 HOURS SCHEDULED
Status: DISPENSED | OUTPATIENT
Start: 2025-05-22 | End: 2025-05-23

## 2025-05-22 RX ORDER — LIDOCAINE HYDROCHLORIDE 10 MG/ML
INJECTION, SOLUTION EPIDURAL; INFILTRATION; INTRACAUDAL; PERINEURAL AS NEEDED
Status: DISCONTINUED | OUTPATIENT
Start: 2025-05-22 | End: 2025-05-22

## 2025-05-22 RX ORDER — KETOROLAC TROMETHAMINE 30 MG/ML
INJECTION, SOLUTION INTRAMUSCULAR; INTRAVENOUS AS NEEDED
Status: DISCONTINUED | OUTPATIENT
Start: 2025-05-22 | End: 2025-05-22

## 2025-05-22 RX ORDER — APREPITANT 40 MG/1
40 CAPSULE ORAL ONCE
Status: COMPLETED | OUTPATIENT
Start: 2025-05-22 | End: 2025-05-22

## 2025-05-22 RX ORDER — DROPERIDOL 2.5 MG/ML
0.62 INJECTION, SOLUTION INTRAMUSCULAR; INTRAVENOUS ONCE AS NEEDED
Status: DISCONTINUED | OUTPATIENT
Start: 2025-05-22 | End: 2025-05-22 | Stop reason: HOSPADM

## 2025-05-22 RX ORDER — SODIUM CHLORIDE, SODIUM LACTATE, POTASSIUM CHLORIDE, CALCIUM CHLORIDE 600; 310; 30; 20 MG/100ML; MG/100ML; MG/100ML; MG/100ML
100 INJECTION, SOLUTION INTRAVENOUS CONTINUOUS
Status: DISCONTINUED | OUTPATIENT
Start: 2025-05-22 | End: 2025-05-22

## 2025-05-22 RX ORDER — ACETAMINOPHEN 10 MG/ML
1000 INJECTION, SOLUTION INTRAVENOUS EVERY 6 HOURS
Status: DISPENSED | OUTPATIENT
Start: 2025-05-22 | End: 2025-05-23

## 2025-05-22 RX ORDER — OXYCODONE HCL 5 MG/5 ML
5 SOLUTION, ORAL ORAL EVERY 6 HOURS PRN
Status: DISCONTINUED | OUTPATIENT
Start: 2025-05-22 | End: 2025-05-25

## 2025-05-22 RX ORDER — PROPOFOL 10 MG/ML
INJECTION, EMULSION INTRAVENOUS AS NEEDED
Status: DISCONTINUED | OUTPATIENT
Start: 2025-05-22 | End: 2025-05-22

## 2025-05-22 RX ORDER — LABETALOL HYDROCHLORIDE 5 MG/ML
INJECTION, SOLUTION INTRAVENOUS AS NEEDED
Status: DISCONTINUED | OUTPATIENT
Start: 2025-05-22 | End: 2025-05-22

## 2025-05-22 RX ORDER — ONDANSETRON HYDROCHLORIDE 2 MG/ML
4 INJECTION, SOLUTION INTRAVENOUS EVERY 8 HOURS PRN
Status: DISCONTINUED | OUTPATIENT
Start: 2025-05-22 | End: 2025-05-26 | Stop reason: HOSPADM

## 2025-05-22 RX ORDER — ROCURONIUM BROMIDE 10 MG/ML
INJECTION, SOLUTION INTRAVENOUS AS NEEDED
Status: DISCONTINUED | OUTPATIENT
Start: 2025-05-22 | End: 2025-05-22

## 2025-05-22 RX ORDER — HEPARIN SODIUM 5000 [USP'U]/ML
5000 INJECTION, SOLUTION INTRAVENOUS; SUBCUTANEOUS ONCE
Status: COMPLETED | OUTPATIENT
Start: 2025-05-22 | End: 2025-05-22

## 2025-05-22 RX ORDER — METOCLOPRAMIDE HYDROCHLORIDE 5 MG/ML
10 INJECTION INTRAMUSCULAR; INTRAVENOUS EVERY 6 HOURS PRN
Status: DISCONTINUED | OUTPATIENT
Start: 2025-05-22 | End: 2025-05-22

## 2025-05-22 RX ORDER — SODIUM CHLORIDE, SODIUM LACTATE, POTASSIUM CHLORIDE, CALCIUM CHLORIDE 600; 310; 30; 20 MG/100ML; MG/100ML; MG/100ML; MG/100ML
20 INJECTION, SOLUTION INTRAVENOUS CONTINUOUS
Status: DISCONTINUED | OUTPATIENT
Start: 2025-05-22 | End: 2025-05-22

## 2025-05-22 RX ORDER — SIMETHICONE 80 MG
80 TABLET,CHEWABLE ORAL EVERY 4 HOURS PRN
Status: DISCONTINUED | OUTPATIENT
Start: 2025-05-22 | End: 2025-05-26 | Stop reason: HOSPADM

## 2025-05-22 RX ORDER — MEPERIDINE HYDROCHLORIDE 25 MG/ML
12.5 INJECTION INTRAMUSCULAR; INTRAVENOUS; SUBCUTANEOUS EVERY 10 MIN PRN
Status: DISCONTINUED | OUTPATIENT
Start: 2025-05-22 | End: 2025-05-22 | Stop reason: HOSPADM

## 2025-05-22 RX ORDER — LIDOCAINE HYDROCHLORIDE 40 MG/ML
INJECTION, SOLUTION RETROBULBAR AS NEEDED
Status: DISCONTINUED | OUTPATIENT
Start: 2025-05-22 | End: 2025-05-22

## 2025-05-22 RX ORDER — ACETAMINOPHEN 10 MG/ML
1000 INJECTION, SOLUTION INTRAVENOUS ONCE
Status: COMPLETED | OUTPATIENT
Start: 2025-05-22 | End: 2025-05-22

## 2025-05-22 RX ORDER — METOCLOPRAMIDE HYDROCHLORIDE 5 MG/ML
10 INJECTION INTRAMUSCULAR; INTRAVENOUS EVERY 6 HOURS SCHEDULED
Status: DISCONTINUED | OUTPATIENT
Start: 2025-05-22 | End: 2025-05-26 | Stop reason: HOSPADM

## 2025-05-22 RX ORDER — GABAPENTIN 300 MG/1
600 CAPSULE ORAL ONCE
Status: COMPLETED | OUTPATIENT
Start: 2025-05-22 | End: 2025-05-22

## 2025-05-22 RX ORDER — PANTOPRAZOLE SODIUM 40 MG/1
40 TABLET, DELAYED RELEASE ORAL
Status: DISCONTINUED | OUTPATIENT
Start: 2025-05-23 | End: 2025-05-26 | Stop reason: HOSPADM

## 2025-05-22 RX ORDER — ONDANSETRON HYDROCHLORIDE 2 MG/ML
INJECTION, SOLUTION INTRAVENOUS AS NEEDED
Status: DISCONTINUED | OUTPATIENT
Start: 2025-05-22 | End: 2025-05-22

## 2025-05-22 RX ORDER — FENTANYL CITRATE 50 UG/ML
INJECTION, SOLUTION INTRAMUSCULAR; INTRAVENOUS AS NEEDED
Status: DISCONTINUED | OUTPATIENT
Start: 2025-05-22 | End: 2025-05-22

## 2025-05-22 RX ORDER — OXYCODONE HCL 5 MG/5 ML
10 SOLUTION, ORAL ORAL EVERY 4 HOURS PRN
Status: DISCONTINUED | OUTPATIENT
Start: 2025-05-22 | End: 2025-05-24

## 2025-05-22 RX ADMIN — FENTANYL CITRATE 50 MCG: 50 INJECTION, SOLUTION INTRAMUSCULAR; INTRAVENOUS at 09:31

## 2025-05-22 RX ADMIN — METOCLOPRAMIDE 10 MG: 5 INJECTION, SOLUTION INTRAMUSCULAR; INTRAVENOUS at 18:50

## 2025-05-22 RX ADMIN — SODIUM CHLORIDE, POTASSIUM CHLORIDE, SODIUM LACTATE AND CALCIUM CHLORIDE 150 ML/HR: 600; 310; 30; 20 INJECTION, SOLUTION INTRAVENOUS at 15:55

## 2025-05-22 RX ADMIN — LABETALOL HYDROCHLORIDE 5 MG: 5 INJECTION, SOLUTION INTRAVENOUS at 09:35

## 2025-05-22 RX ADMIN — ROCURONIUM BROMIDE 10 MG: 10 INJECTION, SOLUTION INTRAVENOUS at 09:07

## 2025-05-22 RX ADMIN — FENTANYL CITRATE 50 MCG: 50 INJECTION, SOLUTION INTRAMUSCULAR; INTRAVENOUS at 09:07

## 2025-05-22 RX ADMIN — ONDANSETRON 4 MG: 2 INJECTION, SOLUTION INTRAMUSCULAR; INTRAVENOUS at 21:23

## 2025-05-22 RX ADMIN — SODIUM CHLORIDE, POTASSIUM CHLORIDE, SODIUM LACTATE AND CALCIUM CHLORIDE 100 ML/HR: 600; 310; 30; 20 INJECTION, SOLUTION INTRAVENOUS at 10:54

## 2025-05-22 RX ADMIN — SUGAMMADEX 100 MG: 100 INJECTION, SOLUTION INTRAVENOUS at 10:22

## 2025-05-22 RX ADMIN — METOCLOPRAMIDE 10 MG: 5 INJECTION, SOLUTION INTRAMUSCULAR; INTRAVENOUS at 23:53

## 2025-05-22 RX ADMIN — VANCOMYCIN HYDROCHLORIDE 1500 MG: 10 INJECTION, POWDER, LYOPHILIZED, FOR SOLUTION INTRAVENOUS at 08:35

## 2025-05-22 RX ADMIN — DEXAMETHASONE SODIUM PHOSPHATE 4 MG: 4 INJECTION INTRA-ARTICULAR; INTRALESIONAL; INTRAMUSCULAR; INTRAVENOUS; SOFT TISSUE at 09:19

## 2025-05-22 RX ADMIN — ONDANSETRON 4 MG: 2 INJECTION, SOLUTION INTRAMUSCULAR; INTRAVENOUS at 10:52

## 2025-05-22 RX ADMIN — SODIUM CHLORIDE, POTASSIUM CHLORIDE, SODIUM LACTATE AND CALCIUM CHLORIDE 20 ML/HR: 600; 310; 30; 20 INJECTION, SOLUTION INTRAVENOUS at 08:34

## 2025-05-22 RX ADMIN — LABETALOL HYDROCHLORIDE 5 MG: 5 INJECTION, SOLUTION INTRAVENOUS at 09:43

## 2025-05-22 RX ADMIN — VANCOMYCIN HYDROCHLORIDE 1500 MG: 10 INJECTION, POWDER, LYOPHILIZED, FOR SOLUTION INTRAVENOUS at 21:24

## 2025-05-22 RX ADMIN — LIDOCAINE HYDROCHLORIDE 3 ML: 40 INJECTION, SOLUTION RETROBULBAR; TOPICAL at 09:08

## 2025-05-22 RX ADMIN — HYDROMORPHONE HYDROCHLORIDE 0.5 MG: 1 INJECTION, SOLUTION INTRAMUSCULAR; INTRAVENOUS; SUBCUTANEOUS at 10:45

## 2025-05-22 RX ADMIN — SUCCINYLCHOLINE CHLORIDE 200 MG: 20 INJECTION, SOLUTION INTRAMUSCULAR; INTRAVENOUS at 09:07

## 2025-05-22 RX ADMIN — MIDAZOLAM 2 MG: 1 INJECTION INTRAMUSCULAR; INTRAVENOUS at 08:50

## 2025-05-22 RX ADMIN — KETOROLAC TROMETHAMINE 15 MG: 15 INJECTION, SOLUTION INTRAMUSCULAR; INTRAVENOUS at 18:01

## 2025-05-22 RX ADMIN — SODIUM CHLORIDE, POTASSIUM CHLORIDE, SODIUM LACTATE AND CALCIUM CHLORIDE: 600; 310; 30; 20 INJECTION, SOLUTION INTRAVENOUS at 09:45

## 2025-05-22 RX ADMIN — ROCURONIUM BROMIDE 40 MG: 10 INJECTION, SOLUTION INTRAVENOUS at 09:27

## 2025-05-22 RX ADMIN — HEPARIN SODIUM 5000 UNITS: 5000 INJECTION, SOLUTION INTRAVENOUS; SUBCUTANEOUS at 08:38

## 2025-05-22 RX ADMIN — SCOPOLAMINE 1 PATCH: 1.5 PATCH, EXTENDED RELEASE TRANSDERMAL at 08:37

## 2025-05-22 RX ADMIN — SIMETHICONE 80 MG: 80 TABLET, CHEWABLE ORAL at 21:23

## 2025-05-22 RX ADMIN — APREPITANT 40 MG: 40 CAPSULE ORAL at 08:37

## 2025-05-22 RX ADMIN — ROCURONIUM BROMIDE 50 MG: 10 INJECTION, SOLUTION INTRAVENOUS at 09:17

## 2025-05-22 RX ADMIN — ONDANSETRON 4 MG: 2 INJECTION, SOLUTION INTRAMUSCULAR; INTRAVENOUS at 10:11

## 2025-05-22 RX ADMIN — METOCLOPRAMIDE 10 MG: 5 INJECTION, SOLUTION INTRAMUSCULAR; INTRAVENOUS at 13:34

## 2025-05-22 RX ADMIN — PROPOFOL 25 MCG/KG/MIN: 10 INJECTION, EMULSION INTRAVENOUS at 09:10

## 2025-05-22 RX ADMIN — KETOROLAC TROMETHAMINE 15 MG: 15 INJECTION, SOLUTION INTRAMUSCULAR; INTRAVENOUS at 23:53

## 2025-05-22 RX ADMIN — HYDROMORPHONE HYDROCHLORIDE 0.8 MG: 2 INJECTION, SOLUTION INTRAMUSCULAR; INTRAVENOUS; SUBCUTANEOUS at 09:15

## 2025-05-22 RX ADMIN — PROPOFOL 200 MG: 10 INJECTION, EMULSION INTRAVENOUS at 09:07

## 2025-05-22 RX ADMIN — ACETAMINOPHEN 1000 MG: 1000 INJECTION, SOLUTION INTRAVENOUS at 23:53

## 2025-05-22 RX ADMIN — GABAPENTIN 600 MG: 300 CAPSULE ORAL at 08:38

## 2025-05-22 RX ADMIN — LIDOCAINE HYDROCHLORIDE 50 MG: 10 SOLUTION INTRAVENOUS at 09:07

## 2025-05-22 RX ADMIN — KETOROLAC TROMETHAMINE 30 MG: 30 INJECTION, SOLUTION INTRAMUSCULAR at 10:11

## 2025-05-22 RX ADMIN — ONDANSETRON 4 MG: 2 INJECTION, SOLUTION INTRAMUSCULAR; INTRAVENOUS at 08:50

## 2025-05-22 RX ADMIN — ACETAMINOPHEN 1000 MG: 1000 INJECTION, SOLUTION INTRAVENOUS at 09:28

## 2025-05-22 RX ADMIN — HYDROMORPHONE HYDROCHLORIDE 0.5 MG: 1 INJECTION, SOLUTION INTRAMUSCULAR; INTRAVENOUS; SUBCUTANEOUS at 11:24

## 2025-05-22 RX ADMIN — ROCURONIUM BROMIDE 20 MG: 10 INJECTION, SOLUTION INTRAVENOUS at 09:53

## 2025-05-22 RX ADMIN — PROPOFOL 50 MG: 10 INJECTION, EMULSION INTRAVENOUS at 10:15

## 2025-05-22 RX ADMIN — SUGAMMADEX 200 MG: 100 INJECTION, SOLUTION INTRAVENOUS at 10:20

## 2025-05-22 SDOH — ECONOMIC STABILITY: HOUSING INSECURITY: IN THE LAST 12 MONTHS, WAS THERE A TIME WHEN YOU WERE NOT ABLE TO PAY THE MORTGAGE OR RENT ON TIME?: NO

## 2025-05-22 SDOH — ECONOMIC STABILITY: FOOD INSECURITY: WITHIN THE PAST 12 MONTHS, YOU WORRIED THAT YOUR FOOD WOULD RUN OUT BEFORE YOU GOT THE MONEY TO BUY MORE.: NEVER TRUE

## 2025-05-22 SDOH — SOCIAL STABILITY: SOCIAL INSECURITY
WITHIN THE LAST YEAR, HAVE YOU BEEN KICKED, HIT, SLAPPED, OR OTHERWISE PHYSICALLY HURT BY YOUR PARTNER OR EX-PARTNER?: NO

## 2025-05-22 SDOH — SOCIAL STABILITY: SOCIAL INSECURITY: ARE YOU OR HAVE YOU BEEN THREATENED OR ABUSED PHYSICALLY, EMOTIONALLY, OR SEXUALLY BY ANYONE?: NO

## 2025-05-22 SDOH — SOCIAL STABILITY: SOCIAL INSECURITY: HAVE YOU HAD ANY THOUGHTS OF HARMING ANYONE ELSE?: NO

## 2025-05-22 SDOH — ECONOMIC STABILITY: FOOD INSECURITY: WITHIN THE PAST 12 MONTHS, THE FOOD YOU BOUGHT JUST DIDN'T LAST AND YOU DIDN'T HAVE MONEY TO GET MORE.: NEVER TRUE

## 2025-05-22 SDOH — ECONOMIC STABILITY: HOUSING INSECURITY: AT ANY TIME IN THE PAST 12 MONTHS, WERE YOU HOMELESS OR LIVING IN A SHELTER (INCLUDING NOW)?: NO

## 2025-05-22 SDOH — SOCIAL STABILITY: SOCIAL INSECURITY: ARE THERE ANY APPARENT SIGNS OF INJURIES/BEHAVIORS THAT COULD BE RELATED TO ABUSE/NEGLECT?: NO

## 2025-05-22 SDOH — SOCIAL STABILITY: SOCIAL INSECURITY
WITHIN THE LAST YEAR, HAVE YOU BEEN RAPED OR FORCED TO HAVE ANY KIND OF SEXUAL ACTIVITY BY YOUR PARTNER OR EX-PARTNER?: NO

## 2025-05-22 SDOH — SOCIAL STABILITY: SOCIAL INSECURITY: HAVE YOU HAD THOUGHTS OF HARMING ANYONE ELSE?: NO

## 2025-05-22 SDOH — SOCIAL STABILITY: SOCIAL INSECURITY: DO YOU FEEL ANYONE HAS EXPLOITED OR TAKEN ADVANTAGE OF YOU FINANCIALLY OR OF YOUR PERSONAL PROPERTY?: NO

## 2025-05-22 SDOH — ECONOMIC STABILITY: INCOME INSECURITY: IN THE PAST 12 MONTHS HAS THE ELECTRIC, GAS, OIL, OR WATER COMPANY THREATENED TO SHUT OFF SERVICES IN YOUR HOME?: NO

## 2025-05-22 SDOH — ECONOMIC STABILITY: FOOD INSECURITY: HOW HARD IS IT FOR YOU TO PAY FOR THE VERY BASICS LIKE FOOD, HOUSING, MEDICAL CARE, AND HEATING?: NOT HARD AT ALL

## 2025-05-22 SDOH — SOCIAL STABILITY: SOCIAL INSECURITY: WITHIN THE LAST YEAR, HAVE YOU BEEN HUMILIATED OR EMOTIONALLY ABUSED IN OTHER WAYS BY YOUR PARTNER OR EX-PARTNER?: NO

## 2025-05-22 SDOH — SOCIAL STABILITY: SOCIAL INSECURITY: ABUSE: ADULT

## 2025-05-22 SDOH — SOCIAL STABILITY: SOCIAL INSECURITY: HAS ANYONE EVER THREATENED TO HURT YOUR FAMILY OR YOUR PETS?: NO

## 2025-05-22 SDOH — SOCIAL STABILITY: SOCIAL INSECURITY: WITHIN THE LAST YEAR, HAVE YOU BEEN AFRAID OF YOUR PARTNER OR EX-PARTNER?: NO

## 2025-05-22 SDOH — ECONOMIC STABILITY: HOUSING INSECURITY: IN THE PAST 12 MONTHS, HOW MANY TIMES HAVE YOU MOVED WHERE YOU WERE LIVING?: 0

## 2025-05-22 SDOH — SOCIAL STABILITY: SOCIAL INSECURITY: DOES ANYONE TRY TO KEEP YOU FROM HAVING/CONTACTING OTHER FRIENDS OR DOING THINGS OUTSIDE YOUR HOME?: NO

## 2025-05-22 SDOH — ECONOMIC STABILITY: HOUSING INSECURITY: DO YOU FEEL UNSAFE GOING BACK TO THE PLACE WHERE YOU LIVE?: NO

## 2025-05-22 SDOH — HEALTH STABILITY: MENTAL HEALTH: CURRENT SMOKER: 0

## 2025-05-22 SDOH — SOCIAL STABILITY: SOCIAL INSECURITY: DO YOU FEEL UNSAFE GOING BACK TO THE PLACE WHERE YOU ARE LIVING?: NO

## 2025-05-22 SDOH — ECONOMIC STABILITY: TRANSPORTATION INSECURITY: IN THE PAST 12 MONTHS, HAS LACK OF TRANSPORTATION KEPT YOU FROM MEDICAL APPOINTMENTS OR FROM GETTING MEDICATIONS?: NO

## 2025-05-22 ASSESSMENT — ACTIVITIES OF DAILY LIVING (ADL)
FEEDING YOURSELF: INDEPENDENT
LACK_OF_TRANSPORTATION: NO
TOILETING: INDEPENDENT
LACK_OF_TRANSPORTATION: NO
WALKS IN HOME: INDEPENDENT
HEARING - LEFT EAR: FUNCTIONAL
DRESSING YOURSELF: INDEPENDENT
GROOMING: INDEPENDENT
BATHING: INDEPENDENT
PATIENT'S MEMORY ADEQUATE TO SAFELY COMPLETE DAILY ACTIVITIES?: YES
HEARING - RIGHT EAR: FUNCTIONAL
JUDGMENT_ADEQUATE_SAFELY_COMPLETE_DAILY_ACTIVITIES: YES
ADEQUATE_TO_COMPLETE_ADL: YES

## 2025-05-22 ASSESSMENT — COGNITIVE AND FUNCTIONAL STATUS - GENERAL
MOBILITY SCORE: 24
DAILY ACTIVITIY SCORE: 24
PATIENT BASELINE BEDBOUND: NO
MOBILITY SCORE: 24
DAILY ACTIVITIY SCORE: 24

## 2025-05-22 ASSESSMENT — COLUMBIA-SUICIDE SEVERITY RATING SCALE - C-SSRS
1. IN THE PAST MONTH, HAVE YOU WISHED YOU WERE DEAD OR WISHED YOU COULD GO TO SLEEP AND NOT WAKE UP?: NO
6. HAVE YOU EVER DONE ANYTHING, STARTED TO DO ANYTHING, OR PREPARED TO DO ANYTHING TO END YOUR LIFE?: NO
2. HAVE YOU ACTUALLY HAD ANY THOUGHTS OF KILLING YOURSELF?: NO

## 2025-05-22 ASSESSMENT — PAIN DESCRIPTION - DESCRIPTORS
DESCRIPTORS: ACHING
DESCRIPTORS: ACHING

## 2025-05-22 ASSESSMENT — LIFESTYLE VARIABLES
SKIP TO QUESTIONS 9-10: 1
HOW OFTEN DO YOU HAVE 6 OR MORE DRINKS ON ONE OCCASION: NEVER
HOW OFTEN DO YOU HAVE A DRINK CONTAINING ALCOHOL: NEVER
PRESCIPTION_ABUSE_PAST_12_MONTHS: NO
AUDIT-C TOTAL SCORE: 0
AUDIT-C TOTAL SCORE: 0
SUBSTANCE_ABUSE_PAST_12_MONTHS: NO
HOW MANY STANDARD DRINKS CONTAINING ALCOHOL DO YOU HAVE ON A TYPICAL DAY: PATIENT DOES NOT DRINK

## 2025-05-22 ASSESSMENT — PAIN - FUNCTIONAL ASSESSMENT
PAIN_FUNCTIONAL_ASSESSMENT: 0-10

## 2025-05-22 ASSESSMENT — PAIN SCALES - GENERAL
PAINLEVEL_OUTOF10: 5 - MODERATE PAIN
PAINLEVEL_OUTOF10: 6
PAINLEVEL_OUTOF10: 6
PAINLEVEL_OUTOF10: 0 - NO PAIN
PAINLEVEL_OUTOF10: 7
PAINLEVEL_OUTOF10: 9
PAINLEVEL_OUTOF10: 8

## 2025-05-22 ASSESSMENT — PATIENT HEALTH QUESTIONNAIRE - PHQ9
SUM OF ALL RESPONSES TO PHQ9 QUESTIONS 1 & 2: 0
2. FEELING DOWN, DEPRESSED OR HOPELESS: NOT AT ALL
1. LITTLE INTEREST OR PLEASURE IN DOING THINGS: NOT AT ALL

## 2025-05-22 NOTE — OP NOTE
GASTRECTOMY, SLEEVE, LAPAROSCOPIC Operative Note     Date: 2025  OR Location: [facility] OR    Name: Lula Del Angel, : 1985, Age: 40 y.o., MRN: [id number], Sex: female    Diagnosis  Pre-op Diagnosis      * Bariatric surgery status [Z98.84] Post-op Diagnosis     * Bariatric surgery status [Z98.84]     Procedures  GASTRECTOMY, SLEEVE, LAPAROSCOPIC  38172 - RI LAPS GSTRC RSTRICTIV PX LONGITUDINAL GASTRECTOMY    RI EGD TRANSORAL BIOPSY SINGLE/MULTIPLE [96045]  Surgeons      * Hilary Wolf - Primary    Resident/Fellow/Other Assistant:  Surgeons and Role:  * No surgeons found with a matching role *  1st assist: Jeff Sam MD,  No qualified resident was   available to assist with this case.  The fellow assisted with all   portions of the case including the critical portions.   Staff:   Circulator: Souleymane Cherry Person: Amanda  Surgical Assistant: Shaista Cherry Person: Maru    Anesthesia Staff: Anesthesiologist: Hood Luis MD  CRNA: RITESH Benito-JOEY    Procedure Summary  Anesthesia: General  ASA: III  Estimated Blood Loss: 5mL  Intra-op Medications:   Administrations occurring from 0905 to 1120 on 25:   Medication Name Total Dose   sodium chloride 0.9 % irrigation solution 1,000 mL   BUPivacaine-EPINEPHrine (Marcaine w/EPI) 30 mL in sodium chloride 0.9% 30 mL syringe 50 mL   dexAMETHasone (Decadron) 4 mg/mL IV Syringe 2 mL 4 mg   fentaNYL (Sublimaze) injection 50 mcg/mL 100 mcg   HYDROmorphone (Dilaudid) injection 2 mg/mL 0.8 mg   ketorolac (Toradol) injection 30 mg 30 mg   labetalol (Normodyne,Trandate) injection 10 mg   lactated Ringer's infusion 31.67 mL   lidocaine PF (Xylocaine-MPF) local injection 1 % 50 mg   lidocaine PF (Xylocaine-MPF) local injection 4 % 3 mL   ondansetron (Zofran) 2 mg/mL injection 4 mg   propofol (Diprivan) injection 10 mg/mL 436 mg   rocuronium (ZeMuron) 50 mg/5 mL injection 120 mg   succinylcholine (Anectine) 20 mg/mL injection 200 mg   sugammadex (Bridion) 200 mg/2  mL injection 300 mg   acetaminophen (Ofirmev) injection 1,000 mg 1,000 mg              Anesthesia Record               Intraprocedure I/O Totals          Intake    lactated Ringer's infusion 1000.00 mL    Total Intake 1000 mL          Specimen:   ID Type Source Tests Collected by Time   1 : GASTRIC REMNANT Tissue STOMACH SLEEVE RESECTION SURGICAL PATHOLOGY EXAM Hilary Wolf MD MPH 5/22/2025 0942                 Drains and/or Catheters: * None in log *    Tourniquet Times:         Implants:     Findings: nromal anatomy    Indications: Lula Del Angel is an 40 y.o. female who is having surgery for Bariatric surgery status [Z98.84]. Bmi 45 and multiple comorbidities of her obesity    The patient was seen in the preoperative area. The risks, benefits, complications, treatment options, non-operative alternatives, expected recovery and outcomes were discussed with the patient. The possibilities of reaction to medication, pulmonary aspiration, injury to surrounding structures, bleeding, recurrent infection, the need for additional procedures, failure to diagnose a condition, and creating a complication requiring transfusion or operation were discussed with the patient. The patient concurred with the proposed plan, giving informed consent.  The site of surgery was properly noted/marked if necessary per policy. The patient has been actively warmed in preoperative area. Preoperative antibiotics have been ordered and given within 1 hours of incision. Venous thrombosis prophylaxis have been ordered including bilateral sequential compression devices and chemical prophylaxis    Procedure Details:   Indications: Lula Del Angel is a 40 y.o. year old female patient with   referred for  .  who is having surgery for Morbid obesity with BMI of 50.0-59.9, adult (Multi) [E66.01, Z68.43]. Comorbidities of joint pain, htn    The patient was seen in the preoperative area. The risks, benefits, complications, treatment options, non-operative  alternatives, expected recovery and outcomes were discussed with the patient. The possibilities of reaction to medication, pulmonary aspiration, injury to surrounding structures, bleeding, recurrent infection, the need for additional procedures, failure to diagnose a condition, and creating a complication requiring transfusion or operation were discussed with the patient. The patient concurred with the proposed plan, giving informed consent.  The site of surgery was properly noted/marked if necessary per policy. The patient has been actively warmed in preoperative area. Preoperative antibiotics have been ordered and given within 1 hours of incision. Venous thrombosis prophylaxis have been ordered including bilateral sequential compression devices and chemical prophylaxis    Procedure Details: Date of Procedure: [unfilled]       PREOPERATIVE DIAGNOSIS:   Morbid obesity.       POSTOPERATIVE DIAGNOSIS:   Morbid obesity.       OPERATION/PROCEDURE:   Laparoscopic sleeve gastrectomy, upper endoscopy, bilateral TAP   blocks.       SURGEON:   Hilary Wolf MD, MPH.       ASSISTANT(S):   First assistant is Jeff Sam MD.  No qualified resident was   available to assist with this case.  The fellow assisted with all   portions of the case including the critical portions.       ANESTHESIA:   General.       COMPLICATIONS:   None.       SPECIMEN:   Gastric remnant.       INDICATION FOR PROCEDURE:   The patient is a 40 y.o. female    with longstanding history of   morbid obesity.  She has failed multiple weight loss attempts.  She   has a BMI Body mass index is 44.65 kg/m².  and now presents for weight loss surgery.       DESCRIPTION OF PROCEDURE:   The patient was brought to the OR, placed in supine position.   General anesthesia was induced.  She was prepped and draped in usual   sterile fashion.  Next, incision was made in left upper quadrant.  A   5 mm optical trocar was used to enter the peritoneal cavity under   direct  endoscopic visualization.  The abdomen was inflated with CO2   to 15 mmHg.  A 5 mm 30-degree scope   was inserted into the abdomen inspected, and no gross abnormalities   were noted.  We placed the trocar for the Titan stapler 26 cm from the xiphoid process and to the right of the umbilicus.  We are placing another 5 mm trocar just lateral and superior to this and the semilunar line.  And   all were placed under visualization after anesthetizing the area with   0.5% Marcaine.  We then placed a 36-Algerian ViSiGi down toward the   pylorus and this was left in place for the duration of the case.  We   entered the lesser sac using Cortex device.  The short gastrics   were taken down all the way around such that the left thais could   clearly be seen.  We mobilized the stomach distally down towards the   pylorus.  Then using the tube as our guide, we created our sleeve   gastrectomy.  We then marked the stomach 1 instrument length from the goal of his probable 3 cm from the incisura, and 5 cm back from the pylorus.  We then brought the Titan stapler into view and the stomach was manipulated through the jaws.  The stapler was reinforced with SEAMGUARD.  Once the stapler was in place aligning all of the marks that were made previously, the device was closed and then we checked to make sure that no tissue was in the no tissue zone.  Adjustments were made so that this was placed perfectly.  We then fired the stapler without any difficulty.  The stapling device was removed under direct visualization.  The patient had a burp beautiful looking sleeve with no abnormalities along the staple line.  We then clipped any   bleeding sites along the staple line.  We did an intraoperative leak   test first with the ViSiGi.  No evidence of any air leaks was noted.   We then did an intraoperative endoscopy.  The scope was brought down   transorally into the stomach.  Esophagus was intubated easily.  The   patient had normal-appearing  esophagus.  The sleeve was entered and   had normal morphology.  The staple line started 6 cm proximal to   pylorus and came up to, but not across the squamocolumnar junction.   No narrowing was seen at the incisura.  No air leaks were seen.  No   bleeding was seen.  The stomach was deflated.  The endoscope was   removed.  The remnant was removed through the 15 mm trocar site.  The   site was closed at the level of the fascia using 0 Vicryl suture,   placed in simple fashion using laparoscopic suture passer.  The   abdomen was deflated.  Trocars removed.  Skin was closed at all sites   using 4-0 Vicryl suture placed in a subcuticular fashion.  Prineo was   placed on the wounds.  The patient was awakened from anesthesia and   taken to Recovery in good condition.     Evidence of Infection: No   Complications:  None; patient tolerated the procedure well.    Disposition: PACU - hemodynamically stable.  Condition: stable         Task Performed by RNFA or Surgical Assistant:  Position, prep, drape, hold camera, closure, dressings            Additional Details: none    Attending Attestation: I was present and scrubbed for the entire procedure.    Hilary Wolf  Phone Number: [phone]

## 2025-05-22 NOTE — ANESTHESIA PROCEDURE NOTES
Airway  Date/Time: 5/22/2025 9:08 AM  Reason: elective    Airway not difficult    Staffing  Performed: CRNA   Authorized by: Hood Luis MD    Performed by: RITESH Benito-JOEY  Patient location during procedure: OR    Patient Condition  Indications for airway management: anesthesia  Patient position: sniffing  MILS maintained throughout  Planned trial extubation  Sedation level: deep     Final Airway Details   Preoxygenated: yes  Final airway type: endotracheal airway  Successful airway: ETT  Cuffed: yes   Successful intubation technique: video laryngoscopy  Adjuncts used in placement: intubating stylet  Endotracheal tube insertion site: oral  Blade size: #3  ETT size (mm): 7.5  Cormack-Lehane Classification: grade I - full view of glottis  Placement verified by: chest auscultation and capnometry   Cuff volume (mL): 9  Measured from: teeth  ETT to teeth (cm): 21  Number of attempts at approach: 1  Number of other approaches attempted: 0

## 2025-05-22 NOTE — BRIEF OP NOTE
Date: 2025  OR Location: [facility]    Name: Lula Del Angel, : 1985, Age: 40 y.o., MRN: [id number], Sex: female    Diagnosis  Pre-op Diagnosis      * Bariatric surgery status [Z98.84] Post-op Diagnosis     * Bariatric surgery status [Z98.84]     Procedures  GASTRECTOMY, SLEEVE, LAPAROSCOPIC  02174 - NC LAPS GSTRC RSTRICTIV PX LONGITUDINAL GASTRECTOMY    NC EGD TRANSORAL BIOPSY SINGLE/MULTIPLE [54521]  Surgeons      * Hilary Wolf - Primary    Resident/Fellow/Other Assistant:  Surgeons and Role:  * No surgeons found with a matching role *    Staff:   Circulator: Souleymane  Scrdeyanira Person: Amanda  Surgical Assistant: Shaista Cherry Person: Maru    Anesthesia Staff: Anesthesiologist: Hood Luis MD  CRNA: RITESH Benito-JOEY    Procedure Summary  Anesthesia: General  ASA: III  Estimated Blood Loss: 5 mL  Intra-op Medications:   Administrations occurring from 0905 to 1120 on 25:   Medication Name Total Dose   sodium chloride 0.9 % irrigation solution 1,000 mL   BUPivacaine-EPINEPHrine (Marcaine w/EPI) 30 mL in sodium chloride 0.9% 30 mL syringe 50 mL   lactated Ringer's infusion 31.67 mL   lactated Ringer's infusion 43.33 mL   acetaminophen (Ofirmev) injection 1,000 mg 1,000 mg   ondansetron (Zofran) injection 4 mg 4 mg   dexAMETHasone (Decadron) 4 mg/mL IV Syringe 2 mL 4 mg   fentaNYL (Sublimaze) injection 50 mcg/mL 100 mcg   HYDROmorphone (Dilaudid) injection 0.5 mg 0.5 mg   HYDROmorphone (Dilaudid) injection 2 mg/mL 0.8 mg   ketorolac (Toradol) injection 30 mg 30 mg   labetalol (Normodyne,Trandate) injection 10 mg   lidocaine PF (Xylocaine-MPF) local injection 1 % 50 mg   lidocaine PF (Xylocaine-MPF) local injection 4 % 3 mL   ondansetron (Zofran) 2 mg/mL injection 4 mg   propofol (Diprivan) injection 10 mg/mL 436 mg   rocuronium (ZeMuron) 50 mg/5 mL injection 120 mg   succinylcholine (Anectine) 20 mg/mL injection 200 mg   sugammadex (Bridion) 200 mg/2 mL injection 300 mg              Anesthesia  Record               Intraprocedure I/O Totals          Intake    lactated Ringer's infusion 1700.00 mL    Total Intake 1700 mL       Output    Est. Blood Loss 10 mL    Total Output 10 mL       Net    Net Volume 1690 mL          Specimen:   ID Type Source Tests Collected by Time   1 : GASTRIC REMNANT Tissue STOMACH SLEEVE RESECTION SURGICAL PATHOLOGY EXAM Hilary Wolf MD M[facility] 5/22/2025 0909        Findings: No significant intraabdominal adhesions. No hiatal hernia. Sleeve created with single firing of the Titan stapler. No complications.     Complications:  None; patient tolerated the procedure well.     Disposition: PACU - hemodynamically stable.  Condition: stable  Specimens Collected:   ID Type Source Tests Collected by Time   1 : GASTRIC REMNANT Tissue STOMACH SLEEVE RESECTION SURGICAL PATHOLOGY EXAM Hilary Wolf MD MPH 5/22/2025 0937     Attending Attestation: I was present and scrubbed for the entire procedure.    Hilary Wolf  Phone Number: [phone]

## 2025-05-22 NOTE — ANESTHESIA POSTPROCEDURE EVALUATION
Patient: Lula Del Angel    Procedure Summary       Date: 05/22/25 Room / Location: [facility] 06 / [facility]    Anesthesia Start: 0850 Anesthesia Stop: 1036    Procedure: GASTRECTOMY, SLEEVE, LAPAROSCOPIC Diagnosis:       Bariatric surgery status      (Bariatric surgery status [Z98.84])    Surgeons: Hilary Wolf MD M[facility] Responsible Provider: Hood Luis MD    Anesthesia Type: general ASA Status: 3            Anesthesia Type: general    Vitals Value Taken Time   /80 05/22/25 13:14   Temp 36 °C (96.8 °F) 05/22/25 13:14   Pulse 60 05/22/25 13:45   Resp 16 05/22/25 13:14   SpO2 94 % 05/22/25 13:45   Vitals shown include unfiled device data.    Anesthesia Post Evaluation    Patient location during evaluation: bedside  Patient participation: complete - patient participated  Level of consciousness: awake and alert  Pain management: adequate  Airway patency: patent  Cardiovascular status: acceptable  Respiratory status: acceptable  Hydration status: acceptable  Postoperative Nausea and Vomiting: none        No notable events documented.

## 2025-05-22 NOTE — ANESTHESIA PREPROCEDURE EVALUATION
Patient: Lula Del Angel    Procedure Information       Date/Time: 05/22/25 0905    Procedure: GASTRECTOMY, SLEEVE, LAPAROSCOPIC    Location: [facility] / [facility]    Surgeons: Hilary Wolf MD [facility]            Relevant Problems   Anesthesia (within normal limits)      Cardiac   (+) Essential (primary) hypertension      Pulmonary   (+) C[place]TON (obstructive sleep apnea)      Neuro (within normal limits)      GI (within normal limits)      /Renal (within normal limits)      Liver (within normal limits)      Endocrine   (+) Morbid (severe) obesity due to excess calories (Multi)   (+) Obesity, morbid, BMI 50 or higher (Multi)      Hematology (within normal limits)      Musculoskeletal (within normal limits)      HEENT (within normal limits)      ID (within normal limits)      Skin (within normal limits)      GYN (within normal limits)       Clinical information reviewed:   Tobacco  Allergies  Meds  Problems  Med Hx  Surg Hx  OB Status    Fam Hx  Soc Hx        NPO Detail:  NPO/Void Status  Carbohydrate Drink Given Prior to Surgery? : N  Date of Last Liquid: 05/22/25  Time of Last Liquid: 0600  Date of Last Solid: 05/20/25  Time of Last Solid: 2300  Last Intake Type: Clear fluids  Time of Last Void: 0821         Physical Exam    Airway  Mallampati: I  TM distance: >3 FB  Neck ROM: full  Mouth opening: 3 or more finger widths     Cardiovascular - normal exam   Dental - normal exam     Pulmonary - normal exam   Abdominal - normal exam           Anesthesia Plan    History of general anesthesia?: yes  History of complications of general anesthesia?: no    ASA 3     general     The patient is not a current smoker.    intravenous induction   Anesthetic plan and risks discussed with patient.  Use of blood products discussed with patient who consented to blood products.

## 2025-05-23 ENCOUNTER — APPOINTMENT (OUTPATIENT)
Dept: VASCULAR MEDICINE | Facility: HOSPITAL | Age: 40
DRG: 621 | End: 2025-05-23
Payer: COMMERCIAL

## 2025-05-23 ENCOUNTER — APPOINTMENT (OUTPATIENT)
Dept: RADIOLOGY | Facility: HOSPITAL | Age: 40
DRG: 621 | End: 2025-05-23
Payer: COMMERCIAL

## 2025-05-23 LAB
ALBUMIN SERPL BCP-MCNC: 3.3 G/DL (ref 3.4–5)
ALP SERPL-CCNC: 41 U/L (ref 33–110)
ALT SERPL W P-5'-P-CCNC: 14 U/L (ref 7–45)
ANION GAP SERPL CALC-SCNC: 15 MMOL/L (ref 10–20)
AST SERPL W P-5'-P-CCNC: 11 U/L (ref 9–39)
BASOPHILS # BLD AUTO: 0.02 X10*3/UL (ref 0–0.1)
BASOPHILS NFR BLD AUTO: 0.2 %
BILIRUB SERPL-MCNC: 0.4 MG/DL (ref 0–1.2)
BUN SERPL-MCNC: 5 MG/DL (ref 6–23)
CALCIUM SERPL-MCNC: 8.2 MG/DL (ref 8.6–10.3)
CHLORIDE SERPL-SCNC: 101 MMOL/L (ref 98–107)
CO2 SERPL-SCNC: 25 MMOL/L (ref 21–32)
CREAT SERPL-MCNC: 0.5 MG/DL (ref 0.5–1.05)
EGFRCR SERPLBLD CKD-EPI 2021: >90 ML/MIN/1.73M*2
EOSINOPHIL # BLD AUTO: 0.01 X10*3/UL (ref 0–0.7)
EOSINOPHIL NFR BLD AUTO: 0.1 %
ERYTHROCYTE [DISTWIDTH] IN BLOOD BY AUTOMATED COUNT: 14 % (ref 11.5–14.5)
GLUCOSE SERPL-MCNC: 88 MG/DL (ref 74–99)
HCT VFR BLD AUTO: 36 % (ref 36–46)
HGB BLD-MCNC: 12.1 G/DL (ref 12–16)
IMM GRANULOCYTES # BLD AUTO: 0.02 X10*3/UL (ref 0–0.7)
IMM GRANULOCYTES NFR BLD AUTO: 0.2 % (ref 0–0.9)
LYMPHOCYTES # BLD AUTO: 1.06 X10*3/UL (ref 1.2–4.8)
LYMPHOCYTES NFR BLD AUTO: 12.1 %
MCH RBC QN AUTO: 29.7 PG (ref 26–34)
MCHC RBC AUTO-ENTMCNC: 33.6 G/DL (ref 32–36)
MCV RBC AUTO: 89 FL (ref 80–100)
MONOCYTES # BLD AUTO: 0.73 X10*3/UL (ref 0.1–1)
MONOCYTES NFR BLD AUTO: 8.3 %
NEUTROPHILS # BLD AUTO: 6.92 X10*3/UL (ref 1.2–7.7)
NEUTROPHILS NFR BLD AUTO: 79.1 %
NRBC BLD-RTO: 0 /100 WBCS (ref 0–0)
PLATELET # BLD AUTO: 246 X10*3/UL (ref 150–450)
POTASSIUM SERPL-SCNC: 3.5 MMOL/L (ref 3.5–5.3)
PROT SERPL-MCNC: 5.9 G/DL (ref 6.4–8.2)
RBC # BLD AUTO: 4.07 X10*6/UL (ref 4–5.2)
SODIUM SERPL-SCNC: 137 MMOL/L (ref 136–145)
WBC # BLD AUTO: 8.8 X10*3/UL (ref 4.4–11.3)

## 2025-05-23 PROCEDURE — 93970 EXTREMITY STUDY: CPT | Performed by: INTERNAL MEDICINE

## 2025-05-23 PROCEDURE — 80053 COMPREHEN METABOLIC PANEL: CPT | Performed by: STUDENT IN AN ORGANIZED HEALTH CARE EDUCATION/TRAINING PROGRAM

## 2025-05-23 PROCEDURE — 36415 COLL VENOUS BLD VENIPUNCTURE: CPT | Performed by: STUDENT IN AN ORGANIZED HEALTH CARE EDUCATION/TRAINING PROGRAM

## 2025-05-23 PROCEDURE — 96372 THER/PROPH/DIAG INJ SC/IM: CPT | Performed by: STUDENT IN AN ORGANIZED HEALTH CARE EDUCATION/TRAINING PROGRAM

## 2025-05-23 PROCEDURE — 93970 EXTREMITY STUDY: CPT

## 2025-05-23 PROCEDURE — 2500000001 HC RX 250 WO HCPCS SELF ADMINISTERED DRUGS (ALT 637 FOR MEDICARE OP): Performed by: STUDENT IN AN ORGANIZED HEALTH CARE EDUCATION/TRAINING PROGRAM

## 2025-05-23 PROCEDURE — 94760 N-INVAS EAR/PLS OXIMETRY 1: CPT

## 2025-05-23 PROCEDURE — 74220 X-RAY XM ESOPHAGUS 1CNTRST: CPT

## 2025-05-23 PROCEDURE — 2500000004 HC RX 250 GENERAL PHARMACY W/ HCPCS (ALT 636 FOR OP/ED): Performed by: STUDENT IN AN ORGANIZED HEALTH CARE EDUCATION/TRAINING PROGRAM

## 2025-05-23 PROCEDURE — 74220 X-RAY XM ESOPHAGUS 1CNTRST: CPT | Performed by: RADIOLOGY

## 2025-05-23 PROCEDURE — 2500000005 HC RX 250 GENERAL PHARMACY W/O HCPCS: Performed by: STUDENT IN AN ORGANIZED HEALTH CARE EDUCATION/TRAINING PROGRAM

## 2025-05-23 PROCEDURE — 2550000001 HC RX 255 CONTRASTS: Performed by: SURGERY

## 2025-05-23 PROCEDURE — RXMED WILLOW AMBULATORY MEDICATION CHARGE

## 2025-05-23 PROCEDURE — 7100000011 HC EXTENDED STAY RECOVERY HOURLY - NURSING UNIT

## 2025-05-23 PROCEDURE — 99232 SBSQ HOSP IP/OBS MODERATE 35: CPT | Performed by: STUDENT IN AN ORGANIZED HEALTH CARE EDUCATION/TRAINING PROGRAM

## 2025-05-23 PROCEDURE — 85025 COMPLETE CBC W/AUTO DIFF WBC: CPT | Performed by: STUDENT IN AN ORGANIZED HEALTH CARE EDUCATION/TRAINING PROGRAM

## 2025-05-23 RX ORDER — ONDANSETRON 4 MG/1
4 TABLET, ORALLY DISINTEGRATING ORAL EVERY 8 HOURS PRN
Qty: 90 TABLET | Refills: 0 | Status: SHIPPED | OUTPATIENT
Start: 2025-05-23 | End: 2025-05-26 | Stop reason: HOSPADM

## 2025-05-23 RX ORDER — LIDOCAINE 560 MG/1
2 PATCH PERCUTANEOUS; TOPICAL; TRANSDERMAL DAILY
Status: DISCONTINUED | OUTPATIENT
Start: 2025-05-23 | End: 2025-05-26 | Stop reason: HOSPADM

## 2025-05-23 RX ORDER — OMEPRAZOLE 40 MG/1
40 CAPSULE, DELAYED RELEASE ORAL
Qty: 30 CAPSULE | Refills: 5 | Status: SHIPPED | OUTPATIENT
Start: 2025-05-23 | End: 2025-11-19

## 2025-05-23 RX ORDER — DIATRIZOATE MEGLUMINE AND DIATRIZOATE SODIUM 660; 100 MG/ML; MG/ML
30 SOLUTION ORAL; RECTAL ONCE
Status: COMPLETED | OUTPATIENT
Start: 2025-05-23 | End: 2025-05-23

## 2025-05-23 RX ORDER — OXYCODONE HCL 5 MG/5 ML
5 SOLUTION, ORAL ORAL EVERY 6 HOURS PRN
Qty: 90 ML | Refills: 0 | Status: SHIPPED | OUTPATIENT
Start: 2025-05-23 | End: 2025-05-26 | Stop reason: HOSPADM

## 2025-05-23 RX ADMIN — PANTOPRAZOLE SODIUM 40 MG: 40 INJECTION, POWDER, FOR SOLUTION INTRAVENOUS at 06:04

## 2025-05-23 RX ADMIN — ACETAMINOPHEN 1000 MG: 1000 INJECTION, SOLUTION INTRAVENOUS at 11:00

## 2025-05-23 RX ADMIN — METOCLOPRAMIDE 10 MG: 5 INJECTION, SOLUTION INTRAMUSCULAR; INTRAVENOUS at 06:06

## 2025-05-23 RX ADMIN — OXYCODONE HYDROCHLORIDE 5 MG: 5 SOLUTION ORAL at 10:44

## 2025-05-23 RX ADMIN — SIMETHICONE 80 MG: 80 TABLET, CHEWABLE ORAL at 09:47

## 2025-05-23 RX ADMIN — SIMETHICONE 80 MG: 80 TABLET, CHEWABLE ORAL at 20:15

## 2025-05-23 RX ADMIN — ACETAMINOPHEN 1000 MG: 1000 INJECTION, SOLUTION INTRAVENOUS at 06:05

## 2025-05-23 RX ADMIN — SODIUM CHLORIDE, POTASSIUM CHLORIDE, SODIUM LACTATE AND CALCIUM CHLORIDE 150 ML/HR: 600; 310; 30; 20 INJECTION, SOLUTION INTRAVENOUS at 09:47

## 2025-05-23 RX ADMIN — DIATRIZOATE MEGLUMINE AND DIATRIZOATE SODIUM 30 ML: 660; 100 LIQUID ORAL; RECTAL at 09:17

## 2025-05-23 RX ADMIN — SIMETHICONE 80 MG: 80 TABLET, CHEWABLE ORAL at 06:06

## 2025-05-23 RX ADMIN — KETOROLAC TROMETHAMINE 15 MG: 15 INJECTION, SOLUTION INTRAMUSCULAR; INTRAVENOUS at 18:02

## 2025-05-23 RX ADMIN — ACETAMINOPHEN 1000 MG: 1000 INJECTION, SOLUTION INTRAVENOUS at 18:02

## 2025-05-23 RX ADMIN — ONDANSETRON 4 MG: 2 INJECTION, SOLUTION INTRAMUSCULAR; INTRAVENOUS at 15:04

## 2025-05-23 RX ADMIN — METOCLOPRAMIDE 10 MG: 5 INJECTION, SOLUTION INTRAMUSCULAR; INTRAVENOUS at 11:00

## 2025-05-23 RX ADMIN — SIMETHICONE 80 MG: 80 TABLET, CHEWABLE ORAL at 02:01

## 2025-05-23 RX ADMIN — KETOROLAC TROMETHAMINE 15 MG: 15 INJECTION, SOLUTION INTRAMUSCULAR; INTRAVENOUS at 11:00

## 2025-05-23 RX ADMIN — HEPARIN SODIUM 5000 UNITS: 5000 INJECTION, SOLUTION INTRAVENOUS; SUBCUTANEOUS at 22:16

## 2025-05-23 RX ADMIN — HEPARIN SODIUM 5000 UNITS: 5000 INJECTION, SOLUTION INTRAVENOUS; SUBCUTANEOUS at 15:03

## 2025-05-23 RX ADMIN — METOCLOPRAMIDE 10 MG: 5 INJECTION, SOLUTION INTRAMUSCULAR; INTRAVENOUS at 18:02

## 2025-05-23 RX ADMIN — OXYCODONE HYDROCHLORIDE 5 MG: 5 SOLUTION ORAL at 18:59

## 2025-05-23 RX ADMIN — KETOROLAC TROMETHAMINE 15 MG: 15 INJECTION, SOLUTION INTRAMUSCULAR; INTRAVENOUS at 06:04

## 2025-05-23 RX ADMIN — HEPARIN SODIUM 5000 UNITS: 5000 INJECTION, SOLUTION INTRAVENOUS; SUBCUTANEOUS at 06:04

## 2025-05-23 RX ADMIN — LIDOCAINE 4% 2 PATCH: 40 PATCH TOPICAL at 12:34

## 2025-05-23 ASSESSMENT — COGNITIVE AND FUNCTIONAL STATUS - GENERAL
DAILY ACTIVITIY SCORE: 24
DAILY ACTIVITIY SCORE: 24
MOBILITY SCORE: 24
MOBILITY SCORE: 24

## 2025-05-23 ASSESSMENT — PAIN DESCRIPTION - LOCATION
LOCATION: ABDOMEN
LOCATION: ABDOMEN

## 2025-05-23 ASSESSMENT — PAIN - FUNCTIONAL ASSESSMENT
PAIN_FUNCTIONAL_ASSESSMENT: 0-10

## 2025-05-23 ASSESSMENT — ACTIVITIES OF DAILY LIVING (ADL): LACK_OF_TRANSPORTATION: NO

## 2025-05-23 ASSESSMENT — PAIN SCALES - GENERAL
PAINLEVEL_OUTOF10: 5 - MODERATE PAIN
PAINLEVEL_OUTOF10: 4
PAINLEVEL_OUTOF10: 7
PAINLEVEL_OUTOF10: 4
PAINLEVEL_OUTOF10: 5 - MODERATE PAIN

## 2025-05-23 NOTE — CARE PLAN
Problem: Pain - Adult  Goal: Verbalizes/displays adequate comfort level or baseline comfort level  Outcome: Progressing     Problem: Safety - Adult  Goal: Free from fall injury  Outcome: Progressing     Problem: Discharge Planning  Goal: Discharge to home or other facility with appropriate resources  Outcome: Progressing     Problem: Chronic Conditions and Co-morbidities  Goal: Patient's chronic conditions and co-morbidity symptoms are monitored and maintained or improved  Outcome: Progressing     Problem: Nutrition  Goal: Nutrient intake appropriate for maintaining nutritional needs  Outcome: Progressing   The patient's goals for the shift include      The clinical goals for the shift include less gas pain

## 2025-05-23 NOTE — CARE PLAN
The patient's goals for the shift include      The clinical goals for the shift include Decrease in gas pain      Problem: Pain  Goal: Takes deep breaths with improved pain control throughout the shift  Outcome: Not Progressing     Problem: Safety - Adult  Goal: Free from fall injury  Outcome: Progressing     Problem: Discharge Planning  Goal: Discharge to home or other facility with appropriate resources  Outcome: Progressing

## 2025-05-23 NOTE — CONSULTS
Nutrition Education Note  Nutrition Assessment      Reason for Assessment: Diet education    History:     Received consult to review post-op Bariatric diet with pt.     Procedure: sleeve gastrectomy on (5/22/2025)    Pt has been provided with Full Liquid diet handout s/p surgery. States has vitamin and protein drinks at home. Denies further questions or concerns at this time, aware RD available if needs arise, pt instructed to follow up with out patient bariatric dietitian.     Vitals:    05/23/25 0449   Weight: 123 kg (270 lb 6.3 oz)      Body mass index is 46.41 kg/m².             Education Documentation  Nutrition instructions after weight loss surgery, taught by Miya Maier RD at 5/23/2025  1:09 PM.  Learner: Patient  Readiness: Acceptance  Method: Explanation, Handout  Response: Verbalizes Understanding, Needs Reinforcement

## 2025-05-23 NOTE — DISCHARGE INSTRUCTIONS
PLEASE NOTE THE FOLLOWING CHANGES IN YOUR MEDICATION REGIMEN:    If you are diabetic:  - please check your blood sugar daily  - If your blood glucose is repeatedly over 200, start taking Metformin 500mg twice a day and contact your PCP to help guide your future management of your blood glucose  - If your blood glucose is less than 60 or you have symptoms of hypoglycemia (light headed, weak), take glucose or drink a cup of juice. If this happens repeatedly, stop all diabetes medication and contact your PCP or endocrinologist.    Medications:  - Medications should be crushed and mixed with full liquids. Capsules may be opened and mixed with full liquids.  - Extended release medications should not be taken. Please contact your primary care physician to convert extended release medications to immediate release form.   - Take 650mg Tylenol with the tylenol you can take tablets (plain white tablets and cut into smaller pieces or crush in applesauce) or Tylenol powder (new product, 500mg per packet, dissolves in the mouth and does not need water every 6 hours for pain. Take between doses of your opioid pain medication. Try to limit the use of your opioid pain medication and only take as needed.  - Slowly add your vitamins to your daily medication regimen as tolerated. You do not have to take them within the first few days after surgery if they are causing you nausea or other problems.   - If you have medications that you still cannot tolerate by your first visit with your surgeon or dietitian, please discuss this.   - You should be receiving or already have received the following medications for your postoperative course: a proton pump inhibitor for acid suppression (omeprazole, esomeprazole, pantoprazole), antinausea medication (ondansetron, metoclopramide), and pain medication (oxycodone, morphine, hydromorphone, hydrocodone). If you are missing any of these, please call the office immediately so we can prescribe them  for you.  - OPEN UP OMEPRAZOLE CAPSULES AND SPRINKLE THEM IN WATER PRIOR TO TAKING. IF YOU HAD BARIATRIC SURGERY, YOU WILL CONTINUE THIS MEDICATION FOR AT LEAST 6 MONTHS.      Constipation  - Take fiber (benefiber or metamucil) twice daily. Please also take colace (docusate) twice a day while taking oxycodone or other opiates. If you remain constipated despite these medications, please take milk of magnesia and call the office to notify us.      Activity instructions:   - No lifting, pulling, or pushing objects greater than 15 pounds for 4 weeks.  - Activity otherwise as tolerated.   - You may shower. Soap and water may run over your incisions. Pat dry. No submerging in baths or  swimming (activities that keep your incisions underwater) for at least two weeks.    - You may not drive while taking narcotics.     Diet:   - You will go home on a full liquid diet (similar to the diet you were on your final day in the hospital)  - You will stay on this full liquid diet for two weeks. Acceptable full liquids include protein shakes, sugar free jello, sugar free pudding, and creamy soups (no chunks). You will continue to drink clear liquids including water and crystal light. You should aim for 64 ounces of fluid per day, with about half of this being full liquids and half of this being clear liquids. Do not exceed 8 oz per hour.  - After two weeks, you should have a discussion with the dietician about progressing to a combination of full liquid and pureed foods.   - Follow a healthy bariatric diet. Target 5 meals per day (3 mid size meals and two small meals). Avoid concentrated sweets (candy, soda) and limit carbohydrate intake with a preference for healthy proteins (lean meats, beans  - For further information, follow the diet instructions listed in your bariatric surgery instruction packet.     Call Provider If:  - Breathing faster or harder than normal.   - Fever of 100.4 F (38 C) or higher or feeling of chills.   -  Feeling very sleepy and difficult to awaken.   - Inability to drink or significant decrease in ability to drink since discharge.   - Vomiting (throwing up) and not able to eat or drink for 12 hours.   - Urinating much less than your normal.  - More than 4 loose, watery bowel movements in 24 hours (diarrhea).   - Any new concerning symptoms.

## 2025-05-23 NOTE — DISCHARGE INSTR - DIET
Refer to handout on nutrition instructions after weight loss surgery, follow up with outpatient dietitian

## 2025-05-23 NOTE — PROGRESS NOTES
"Lula Del Angel is a 40 y.o. female on day 1 of admission presenting with Bariatric surgery status.    Subjective   Patient was seen this morning.  She complains of nausea and poor oral intake.  Her upper GI was unremarkable.  Pain is well-controlled     Objective     Physical Exam  Patient is in no acute distress  No respiratory distress  Abdomen is soft, not tender, not distended  No guarding   Incisions are clean dry and intact  Patient is alert and oriented x 3    Last Recorded Vitals  Blood pressure 150/81, pulse 64, temperature 36.5 °C (97.7 °F), temperature source Temporal, resp. rate 16, height 1.626 m (5' 4\"), weight 123 kg (270 lb 6.3 oz), SpO2 97%.    Intake/Output last 3 Shifts:  I/O last 3 completed shifts:  In: 2430 (19.8 mL/kg) [I.V.:1700 (13.9 mL/kg); IV Piggyback:730]  Out: 1510 (12.3 mL/kg) [Urine:1500 (0.3 mL/kg/hr); Blood:10]  Weight: 122.6 kg     Assessment: 40-year-old female who is post-op day 1 from a laparoscopic sleeve gastrectomy. Patient has significant nausea and is not meeting her goals. Will optimize antiemetics.    Plan:  - Standing Reglan  - Zofran as needed  - Out of bed and ambulate  - DVT prophylaxis  - Encourage PO intake  - Plan for discharge tomorrow     I spent 60 minutes in the professional and overall care of this patient.    Jeff Sam MD  "

## 2025-05-23 NOTE — PROGRESS NOTES
05/23/25 1156   Discharge Planning   Living Arrangements Spouse/significant other   Support Systems Spouse/significant other;Family members;Friends/neighbors   Assistance Needed Patient is A&O X3, on room air at baseline but uses a CPAP at HS, is independent with ADLs and uses no assistive devices at home. Patient is able to transport self to appointments. Patient denies further needs upon discharge.   Type of Residence Private residence   Number of Stairs to Enter Residence 2   Number of Stairs Within Residence 12  (to second floor bedroom)   Do you have animals or pets at home? Yes   Type of Animals or Pets 3 dogs   Who is requesting discharge planning? Provider   Home or Post Acute Services None   Expected Discharge Disposition Home   Does the patient need discharge transport arranged? No   Financial Resource Strain   How hard is it for you to pay for the very basics like food, housing, medical care, and heating? Not hard   Housing Stability   In the last 12 months, was there a time when you were not able to pay the mortgage or rent on time? N   At any time in the past 12 months, were you homeless or living in a shelter (including now)? N   Transportation Needs   In the past 12 months, has lack of transportation kept you from medical appointments or from getting medications? no   In the past 12 months, has lack of transportation kept you from meetings, work, or from getting things needed for daily living? No   Stroke Family Assessment   Stroke Family Assessment Needed No   Intensity of Service   Intensity of Service 0-30 min

## 2025-05-24 ENCOUNTER — APPOINTMENT (OUTPATIENT)
Dept: CARDIOLOGY | Facility: HOSPITAL | Age: 40
End: 2025-05-24
Payer: COMMERCIAL

## 2025-05-24 LAB
ALBUMIN SERPL BCP-MCNC: 3.4 G/DL (ref 3.4–5)
ALP SERPL-CCNC: 41 U/L (ref 33–110)
ALT SERPL W P-5'-P-CCNC: 12 U/L (ref 7–45)
ANION GAP SERPL CALC-SCNC: 14 MMOL/L (ref 10–20)
AST SERPL W P-5'-P-CCNC: 9 U/L (ref 9–39)
BILIRUB SERPL-MCNC: 0.4 MG/DL (ref 0–1.2)
BUN SERPL-MCNC: 6 MG/DL (ref 6–23)
CALCIUM SERPL-MCNC: 8.3 MG/DL (ref 8.6–10.3)
CHLORIDE SERPL-SCNC: 103 MMOL/L (ref 98–107)
CO2 SERPL-SCNC: 25 MMOL/L (ref 21–32)
CREAT SERPL-MCNC: 0.58 MG/DL (ref 0.5–1.05)
EGFRCR SERPLBLD CKD-EPI 2021: >90 ML/MIN/1.73M*2
GLUCOSE SERPL-MCNC: 83 MG/DL (ref 74–99)
POTASSIUM SERPL-SCNC: 3.4 MMOL/L (ref 3.5–5.3)
PROT SERPL-MCNC: 6.1 G/DL (ref 6.4–8.2)
SODIUM SERPL-SCNC: 139 MMOL/L (ref 136–145)

## 2025-05-24 PROCEDURE — 96372 THER/PROPH/DIAG INJ SC/IM: CPT | Performed by: STUDENT IN AN ORGANIZED HEALTH CARE EDUCATION/TRAINING PROGRAM

## 2025-05-24 PROCEDURE — 7100000011 HC EXTENDED STAY RECOVERY HOURLY - NURSING UNIT

## 2025-05-24 PROCEDURE — 2500000004 HC RX 250 GENERAL PHARMACY W/ HCPCS (ALT 636 FOR OP/ED): Mod: JZ | Performed by: SURGERY

## 2025-05-24 PROCEDURE — 2500000001 HC RX 250 WO HCPCS SELF ADMINISTERED DRUGS (ALT 637 FOR MEDICARE OP): Performed by: STUDENT IN AN ORGANIZED HEALTH CARE EDUCATION/TRAINING PROGRAM

## 2025-05-24 PROCEDURE — 36415 COLL VENOUS BLD VENIPUNCTURE: CPT | Performed by: SURGERY

## 2025-05-24 PROCEDURE — 80053 COMPREHEN METABOLIC PANEL: CPT | Performed by: SURGERY

## 2025-05-24 PROCEDURE — 2500000001 HC RX 250 WO HCPCS SELF ADMINISTERED DRUGS (ALT 637 FOR MEDICARE OP): Performed by: SURGERY

## 2025-05-24 PROCEDURE — 2500000004 HC RX 250 GENERAL PHARMACY W/ HCPCS (ALT 636 FOR OP/ED): Performed by: STUDENT IN AN ORGANIZED HEALTH CARE EDUCATION/TRAINING PROGRAM

## 2025-05-24 PROCEDURE — 93005 ELECTROCARDIOGRAM TRACING: CPT

## 2025-05-24 RX ORDER — OXYCODONE HCL 5 MG/5 ML
10 SOLUTION, ORAL ORAL EVERY 6 HOURS PRN
Refills: 0 | Status: DISCONTINUED | OUTPATIENT
Start: 2025-05-24 | End: 2025-05-25

## 2025-05-24 RX ORDER — TRIAMTERENE AND HYDROCHLOROTHIAZIDE 37.5; 25 MG/1; MG/1
1 TABLET ORAL DAILY
Status: DISCONTINUED | OUTPATIENT
Start: 2025-05-24 | End: 2025-05-24

## 2025-05-24 RX ORDER — SODIUM CHLORIDE, SODIUM LACTATE, POTASSIUM CHLORIDE, CALCIUM CHLORIDE 600; 310; 30; 20 MG/100ML; MG/100ML; MG/100ML; MG/100ML
75 INJECTION, SOLUTION INTRAVENOUS CONTINUOUS
Status: DISCONTINUED | OUTPATIENT
Start: 2025-05-24 | End: 2025-05-25

## 2025-05-24 RX ORDER — HYDROCHLOROTHIAZIDE 25 MG/1
25 TABLET ORAL DAILY
Status: DISCONTINUED | OUTPATIENT
Start: 2025-05-24 | End: 2025-05-24

## 2025-05-24 RX ORDER — ACETAMINOPHEN 10 MG/ML
1000 INJECTION, SOLUTION INTRAVENOUS EVERY 6 HOURS
Status: COMPLETED | OUTPATIENT
Start: 2025-05-24 | End: 2025-05-25

## 2025-05-24 RX ORDER — HYDRALAZINE HYDROCHLORIDE 20 MG/ML
10 INJECTION INTRAMUSCULAR; INTRAVENOUS ONCE
Status: COMPLETED | OUTPATIENT
Start: 2025-05-24 | End: 2025-05-24

## 2025-05-24 RX ORDER — HYDRALAZINE HYDROCHLORIDE 20 MG/ML
10 INJECTION INTRAMUSCULAR; INTRAVENOUS EVERY 4 HOURS PRN
Status: DISCONTINUED | OUTPATIENT
Start: 2025-05-24 | End: 2025-05-26 | Stop reason: HOSPADM

## 2025-05-24 RX ORDER — POTASSIUM CHLORIDE 14.9 MG/ML
20 INJECTION INTRAVENOUS
Status: COMPLETED | OUTPATIENT
Start: 2025-05-24 | End: 2025-05-24

## 2025-05-24 RX ADMIN — HEPARIN SODIUM 5000 UNITS: 5000 INJECTION, SOLUTION INTRAVENOUS; SUBCUTANEOUS at 21:46

## 2025-05-24 RX ADMIN — ONDANSETRON 4 MG: 2 INJECTION, SOLUTION INTRAMUSCULAR; INTRAVENOUS at 16:54

## 2025-05-24 RX ADMIN — POTASSIUM CHLORIDE 20 MEQ: 14.9 INJECTION, SOLUTION INTRAVENOUS at 16:36

## 2025-05-24 RX ADMIN — HEPARIN SODIUM 5000 UNITS: 5000 INJECTION, SOLUTION INTRAVENOUS; SUBCUTANEOUS at 06:23

## 2025-05-24 RX ADMIN — OXYCODONE HYDROCHLORIDE 5 MG: 5 SOLUTION ORAL at 21:46

## 2025-05-24 RX ADMIN — METOCLOPRAMIDE 10 MG: 5 INJECTION, SOLUTION INTRAMUSCULAR; INTRAVENOUS at 00:10

## 2025-05-24 RX ADMIN — OXYCODONE HYDROCHLORIDE 5 MG: 5 SOLUTION ORAL at 09:00

## 2025-05-24 RX ADMIN — METOCLOPRAMIDE 10 MG: 5 INJECTION, SOLUTION INTRAMUSCULAR; INTRAVENOUS at 18:48

## 2025-05-24 RX ADMIN — HYDRALAZINE HYDROCHLORIDE 10 MG: 20 INJECTION INTRAMUSCULAR; INTRAVENOUS at 15:45

## 2025-05-24 RX ADMIN — OXYCODONE HYDROCHLORIDE 5 MG: 5 SOLUTION ORAL at 01:39

## 2025-05-24 RX ADMIN — POTASSIUM CHLORIDE 20 MEQ: 14.9 INJECTION, SOLUTION INTRAVENOUS at 14:22

## 2025-05-24 RX ADMIN — SIMETHICONE 80 MG: 80 TABLET, CHEWABLE ORAL at 08:56

## 2025-05-24 RX ADMIN — OXYCODONE HYDROCHLORIDE 10 MG: 5 SOLUTION ORAL at 15:47

## 2025-05-24 RX ADMIN — METOCLOPRAMIDE 10 MG: 5 INJECTION, SOLUTION INTRAMUSCULAR; INTRAVENOUS at 06:23

## 2025-05-24 RX ADMIN — ACETAMINOPHEN 1000 MG: 1000 INJECTION, SOLUTION INTRAVENOUS at 12:59

## 2025-05-24 RX ADMIN — ACETAMINOPHEN 1000 MG: 1000 INJECTION, SOLUTION INTRAVENOUS at 18:54

## 2025-05-24 RX ADMIN — PANTOPRAZOLE SODIUM 40 MG: 40 INJECTION, POWDER, FOR SOLUTION INTRAVENOUS at 06:23

## 2025-05-24 RX ADMIN — HYDRALAZINE HYDROCHLORIDE 5 MG: 20 INJECTION INTRAMUSCULAR; INTRAVENOUS at 14:17

## 2025-05-24 RX ADMIN — SODIUM CHLORIDE, POTASSIUM CHLORIDE, SODIUM LACTATE AND CALCIUM CHLORIDE 150 ML/HR: 600; 310; 30; 20 INJECTION, SOLUTION INTRAVENOUS at 14:17

## 2025-05-24 RX ADMIN — METOCLOPRAMIDE 10 MG: 5 INJECTION, SOLUTION INTRAMUSCULAR; INTRAVENOUS at 12:59

## 2025-05-24 RX ADMIN — SIMETHICONE 80 MG: 80 TABLET, CHEWABLE ORAL at 12:59

## 2025-05-24 RX ADMIN — HEPARIN SODIUM 5000 UNITS: 5000 INJECTION, SOLUTION INTRAVENOUS; SUBCUTANEOUS at 14:21

## 2025-05-24 ASSESSMENT — PAIN - FUNCTIONAL ASSESSMENT
PAIN_FUNCTIONAL_ASSESSMENT: 0-10

## 2025-05-24 ASSESSMENT — PAIN SCALES - GENERAL
PAINLEVEL_OUTOF10: 4
PAINLEVEL_OUTOF10: 4
PAINLEVEL_OUTOF10: 6
PAINLEVEL_OUTOF10: 0 - NO PAIN
PAINLEVEL_OUTOF10: 4
PAINLEVEL_OUTOF10: 7
PAINLEVEL_OUTOF10: 5 - MODERATE PAIN
PAINLEVEL_OUTOF10: 4
PAINLEVEL_OUTOF10: 7
PAINLEVEL_OUTOF10: 4

## 2025-05-24 ASSESSMENT — COGNITIVE AND FUNCTIONAL STATUS - GENERAL
MOBILITY SCORE: 24
MOBILITY SCORE: 24
DAILY ACTIVITIY SCORE: 24
MOBILITY SCORE: 24

## 2025-05-24 ASSESSMENT — PAIN DESCRIPTION - DESCRIPTORS
DESCRIPTORS: ACHING
DESCRIPTORS: BURNING
DESCRIPTORS: ACHING
DESCRIPTORS: ACHING;BURNING;HEAVINESS;DISCOMFORT

## 2025-05-24 ASSESSMENT — PAIN DESCRIPTION - ORIENTATION: ORIENTATION: MID

## 2025-05-24 ASSESSMENT — PAIN DESCRIPTION - LOCATION
LOCATION: ABDOMEN

## 2025-05-24 NOTE — NURSING NOTE
HR is the low 40's asymptomatic. Getting an EKG, holding her home BP drug and giving hydralazine in replace for high BP per [facility] orders. Once I get the EKG I will send it to [facility].

## 2025-05-24 NOTE — CARE PLAN
Problem: Pain  Goal: Takes deep breaths with improved pain control throughout the shift  Outcome: Progressing  Goal: Turns in bed with improved pain control throughout the shift  Outcome: Progressing  Goal: Walks with improved pain control throughout the shift  Outcome: Progressing  Goal: Performs ADL's with improved pain control throughout shift  Outcome: Progressing  Goal: Participates in PT with improved pain control throughout the shift  Outcome: Progressing  Goal: Free from opioid side effects throughout the shift  Outcome: Progressing  Goal: Free from acute confusion related to pain meds throughout the shift  Outcome: Progressing     Problem: Pain - Adult  Goal: Verbalizes/displays adequate comfort level or baseline comfort level  Outcome: Progressing     Problem: Safety - Adult  Goal: Free from fall injury  Outcome: Progressing     Problem: Discharge Planning  Goal: Discharge to home or other facility with appropriate resources  Outcome: Progressing     Problem: Chronic Conditions and Co-morbidities  Goal: Patient's chronic conditions and co-morbidity symptoms are monitored and maintained or improved  Outcome: Progressing     Problem: Nutrition  Goal: Nutrient intake appropriate for maintaining nutritional needs  Outcome: Progressing   The patient's goals for the shift include      The clinical goals for the shift include pain and nausea control, increasing fluids and ambulation    Over the shift, the patient did not make progress toward the following goals. Barriers to progression include pain. Recommendations to address these barriers include meds.    .  Went over goals with patient to increase fluid intake, when to decrease intake, increase ambulation to help move gas, per dayshift-she got up a few times.

## 2025-05-24 NOTE — CARE PLAN
Problem: Pain  Goal: Takes deep breaths with improved pain control throughout the shift  Outcome: Progressing  Goal: Turns in bed with improved pain control throughout the shift  Outcome: Progressing  Goal: Walks with improved pain control throughout the shift  Outcome: Progressing  Goal: Performs ADL's with improved pain control throughout shift  Outcome: Progressing  Goal: Participates in PT with improved pain control throughout the shift  Outcome: Progressing  Goal: Free from opioid side effects throughout the shift  Outcome: Progressing  Goal: Free from acute confusion related to pain meds throughout the shift  Outcome: Progressing     Problem: Pain - Adult  Goal: Verbalizes/displays adequate comfort level or baseline comfort level  Outcome: Progressing     Problem: Safety - Adult  Goal: Free from fall injury  Outcome: Progressing     Problem: Discharge Planning  Goal: Discharge to home or other facility with appropriate resources  Outcome: Progressing     Problem: Chronic Conditions and Co-morbidities  Goal: Patient's chronic conditions and co-morbidity symptoms are monitored and maintained or improved  Outcome: Progressing     Problem: Nutrition  Goal: Nutrient intake appropriate for maintaining nutritional needs  Outcome: Progressing   The patient's goals for the shift include pain control and sleep     The clinical goals for the shift include Patient will verbalize pain as well controlled, at 5 or less, this shift.    Over the shift, the patient did not make progress toward the following goals. Barriers to progression include pain. Recommendations to address these barriers include pain meds and rest. Patient tolerated ambulation to the bathroom this shift. Patient was able to void.   Patient was able to sleep at least 6 hours this shift, on and off. Patient tolerated diet this shift.

## 2025-05-24 NOTE — PROGRESS NOTES
"Lula Del Angel is a 40 y.o. female on day 1 of admission presenting with Bariatric surgery status.    Subjective   Patient seen and examined this morning.  No acute events overnight.  Afebrile with stable hemodynamics.  Some mild hyperkalemia today.  Her nausea is much improved since starting the standing Reglan.  She does complain of some heaviness and pressure-like pain in the epigastrium when she starts drinking.  Has about 3 ounces of fluids an hour.     Objective     Physical Exam  Physical Exam:   Constitutional: Well developed, awake/alert/oriented x3, no distress, alert and cooperative  Eyes: PERRL, EOMI, clear sclera  Head/Neck: Neck supple, no apparent injury, No JVD, trachea midline  Respiratory/Thorax: good chest expansion, thorax symmetric  Cardiovascular: Regular, rate and rhythm,  2+ equal pulses of the extremities  Gastrointestinal: Nondistended, soft, minimally tender, no rebound tenderness or guarding, no masses palpable, incisions c/d/i  Musculoskeletal: ROM intact, no joint swelling, normal strength  Extremities: normal extremities, no cyanosis edema, contusions or wounds, no clubbing  Neurological: alert and oriented x3, intact senses,  Psychological: Appropriate mood and behavior  Skin: Warm and dry, no lesions, no rashes      Last Recorded Vitals  Blood pressure (!) 166/94, pulse 71, temperature 36.6 °C (97.9 °F), temperature source Temporal, resp. rate 16, height 1.626 m (5' 4\"), weight 122 kg (270 lb), SpO2 97%.    Intake/Output last 3 Shifts:  I/O last 3 completed shifts:  In: 3290 (26.9 mL/kg) [P.O.:360; I.V.:2000 (16.3 mL/kg); IV Piggyback:930]  Out: 2800 (22.9 mL/kg) [Urine:2800 (0.6 mL/kg/hr)]  Weight: 122.5 kg     Assessment: 40-year-old female who is post-op day 2 from a laparoscopic sleeve gastrectomy.  Her nausea has improved, though she is having pressure-like sensations with p.o. intake.  Plan:  - Standing Reglan  - Zofran as needed  - Out of bed and ambulate  - DVT prophylaxis  - " Encourage PO intake  - Plan for discharge tomorrow     I spent 60 minutes in the professional and overall care of this patient.    Don Martins MD

## 2025-05-25 VITALS
TEMPERATURE: 96.1 F | OXYGEN SATURATION: 97 % | HEART RATE: 60 BPM | RESPIRATION RATE: 16 BRPM | HEIGHT: 64 IN | SYSTOLIC BLOOD PRESSURE: 152 MMHG | WEIGHT: 277.9 LBS | DIASTOLIC BLOOD PRESSURE: 75 MMHG | BODY MASS INDEX: 47.44 KG/M2

## 2025-05-25 LAB
ALBUMIN SERPL BCP-MCNC: 3 G/DL (ref 3.4–5)
ALP SERPL-CCNC: 36 U/L (ref 33–110)
ALT SERPL W P-5'-P-CCNC: 9 U/L (ref 7–45)
ANION GAP SERPL CALC-SCNC: 11 MMOL/L (ref 10–20)
AST SERPL W P-5'-P-CCNC: 8 U/L (ref 9–39)
BASOPHILS # BLD AUTO: 0.02 X10*3/UL (ref 0–0.1)
BASOPHILS NFR BLD AUTO: 0.4 %
BILIRUB SERPL-MCNC: 0.4 MG/DL (ref 0–1.2)
BUN SERPL-MCNC: 6 MG/DL (ref 6–23)
CALCIUM SERPL-MCNC: 8 MG/DL (ref 8.6–10.3)
CHLORIDE SERPL-SCNC: 101 MMOL/L (ref 98–107)
CO2 SERPL-SCNC: 24 MMOL/L (ref 21–32)
CREAT SERPL-MCNC: 0.61 MG/DL (ref 0.5–1.05)
EGFRCR SERPLBLD CKD-EPI 2021: >90 ML/MIN/1.73M*2
EOSINOPHIL # BLD AUTO: 0.13 X10*3/UL (ref 0–0.7)
EOSINOPHIL NFR BLD AUTO: 2.4 %
ERYTHROCYTE [DISTWIDTH] IN BLOOD BY AUTOMATED COUNT: 14.3 % (ref 11.5–14.5)
GLUCOSE SERPL-MCNC: 74 MG/DL (ref 74–99)
HCT VFR BLD AUTO: 35.6 % (ref 36–46)
HGB BLD-MCNC: 11.8 G/DL (ref 12–16)
IMM GRANULOCYTES # BLD AUTO: 0.02 X10*3/UL (ref 0–0.7)
IMM GRANULOCYTES NFR BLD AUTO: 0.4 % (ref 0–0.9)
LYMPHOCYTES # BLD AUTO: 0.96 X10*3/UL (ref 1.2–4.8)
LYMPHOCYTES NFR BLD AUTO: 17.9 %
MAGNESIUM SERPL-MCNC: 1.57 MG/DL (ref 1.6–2.4)
MCH RBC QN AUTO: 30.2 PG (ref 26–34)
MCHC RBC AUTO-ENTMCNC: 33.1 G/DL (ref 32–36)
MCV RBC AUTO: 91 FL (ref 80–100)
MONOCYTES # BLD AUTO: 0.42 X10*3/UL (ref 0.1–1)
MONOCYTES NFR BLD AUTO: 7.9 %
NEUTROPHILS # BLD AUTO: 3.8 X10*3/UL (ref 1.2–7.7)
NEUTROPHILS NFR BLD AUTO: 71 %
NRBC BLD-RTO: 0 /100 WBCS (ref 0–0)
PHOSPHATE SERPL-MCNC: 3.1 MG/DL (ref 2.5–4.9)
PLATELET # BLD AUTO: 179 X10*3/UL (ref 150–450)
POTASSIUM SERPL-SCNC: 3.6 MMOL/L (ref 3.5–5.3)
PROT SERPL-MCNC: 5.6 G/DL (ref 6.4–8.2)
RBC # BLD AUTO: 3.91 X10*6/UL (ref 4–5.2)
SODIUM SERPL-SCNC: 132 MMOL/L (ref 136–145)
WBC # BLD AUTO: 5.4 X10*3/UL (ref 4.4–11.3)

## 2025-05-25 PROCEDURE — 2500000001 HC RX 250 WO HCPCS SELF ADMINISTERED DRUGS (ALT 637 FOR MEDICARE OP): Performed by: STUDENT IN AN ORGANIZED HEALTH CARE EDUCATION/TRAINING PROGRAM

## 2025-05-25 PROCEDURE — 96372 THER/PROPH/DIAG INJ SC/IM: CPT | Performed by: STUDENT IN AN ORGANIZED HEALTH CARE EDUCATION/TRAINING PROGRAM

## 2025-05-25 PROCEDURE — 2500000004 HC RX 250 GENERAL PHARMACY W/ HCPCS (ALT 636 FOR OP/ED): Mod: JZ | Performed by: SURGERY

## 2025-05-25 PROCEDURE — 83735 ASSAY OF MAGNESIUM: CPT | Performed by: SURGERY

## 2025-05-25 PROCEDURE — 84100 ASSAY OF PHOSPHORUS: CPT | Performed by: SURGERY

## 2025-05-25 PROCEDURE — 1100000001 HC PRIVATE ROOM DAILY

## 2025-05-25 PROCEDURE — 36415 COLL VENOUS BLD VENIPUNCTURE: CPT | Performed by: SURGERY

## 2025-05-25 PROCEDURE — 80053 COMPREHEN METABOLIC PANEL: CPT | Performed by: SURGERY

## 2025-05-25 PROCEDURE — 2500000004 HC RX 250 GENERAL PHARMACY W/ HCPCS (ALT 636 FOR OP/ED): Performed by: STUDENT IN AN ORGANIZED HEALTH CARE EDUCATION/TRAINING PROGRAM

## 2025-05-25 PROCEDURE — 85025 COMPLETE CBC W/AUTO DIFF WBC: CPT | Performed by: SURGERY

## 2025-05-25 PROCEDURE — 94760 N-INVAS EAR/PLS OXIMETRY 1: CPT

## 2025-05-25 RX ORDER — FUROSEMIDE 10 MG/ML
20 INJECTION INTRAMUSCULAR; INTRAVENOUS ONCE
Status: DISCONTINUED | OUTPATIENT
Start: 2025-05-25 | End: 2025-05-26 | Stop reason: HOSPADM

## 2025-05-25 RX ORDER — KETOROLAC TROMETHAMINE 30 MG/ML
30 INJECTION, SOLUTION INTRAMUSCULAR; INTRAVENOUS EVERY 6 HOURS SCHEDULED
Status: DISCONTINUED | OUTPATIENT
Start: 2025-05-25 | End: 2025-05-25

## 2025-05-25 RX ORDER — SODIUM CHLORIDE, SODIUM LACTATE, POTASSIUM CHLORIDE, CALCIUM CHLORIDE 600; 310; 30; 20 MG/100ML; MG/100ML; MG/100ML; MG/100ML
150 INJECTION, SOLUTION INTRAVENOUS CONTINUOUS
Status: DISCONTINUED | OUTPATIENT
Start: 2025-05-25 | End: 2025-05-25

## 2025-05-25 RX ORDER — KETOROLAC TROMETHAMINE 30 MG/ML
30 INJECTION, SOLUTION INTRAMUSCULAR; INTRAVENOUS EVERY 6 HOURS SCHEDULED
Status: DISCONTINUED | OUTPATIENT
Start: 2025-05-25 | End: 2025-05-26 | Stop reason: HOSPADM

## 2025-05-25 RX ORDER — MAGNESIUM SULFATE HEPTAHYDRATE 40 MG/ML
2 INJECTION, SOLUTION INTRAVENOUS ONCE
Status: COMPLETED | OUTPATIENT
Start: 2025-05-25 | End: 2025-05-25

## 2025-05-25 RX ORDER — TRAMADOL HYDROCHLORIDE 50 MG/1
50 TABLET, FILM COATED ORAL EVERY 4 HOURS PRN
Status: DISCONTINUED | OUTPATIENT
Start: 2025-05-25 | End: 2025-05-25

## 2025-05-25 RX ORDER — SODIUM CHLORIDE, SODIUM LACTATE, POTASSIUM CHLORIDE, CALCIUM CHLORIDE 600; 310; 30; 20 MG/100ML; MG/100ML; MG/100ML; MG/100ML
75 INJECTION, SOLUTION INTRAVENOUS CONTINUOUS
Status: DISCONTINUED | OUTPATIENT
Start: 2025-05-25 | End: 2025-05-26 | Stop reason: HOSPADM

## 2025-05-25 RX ORDER — ACETAMINOPHEN 10 MG/ML
1000 INJECTION, SOLUTION INTRAVENOUS EVERY 6 HOURS
Status: DISCONTINUED | OUTPATIENT
Start: 2025-05-25 | End: 2025-05-26 | Stop reason: HOSPADM

## 2025-05-25 RX ORDER — TRAMADOL HYDROCHLORIDE 50 MG/1
50 TABLET, FILM COATED ORAL EVERY 6 HOURS PRN
Status: DISCONTINUED | OUTPATIENT
Start: 2025-05-25 | End: 2025-05-26 | Stop reason: HOSPADM

## 2025-05-25 RX ORDER — FUROSEMIDE 10 MG/ML
20 INJECTION INTRAMUSCULAR; INTRAVENOUS ONCE
Status: COMPLETED | OUTPATIENT
Start: 2025-05-25 | End: 2025-05-25

## 2025-05-25 RX ADMIN — HEPARIN SODIUM 5000 UNITS: 5000 INJECTION, SOLUTION INTRAVENOUS; SUBCUTANEOUS at 21:46

## 2025-05-25 RX ADMIN — METOCLOPRAMIDE 10 MG: 5 INJECTION, SOLUTION INTRAMUSCULAR; INTRAVENOUS at 05:32

## 2025-05-25 RX ADMIN — METOCLOPRAMIDE 10 MG: 5 INJECTION, SOLUTION INTRAMUSCULAR; INTRAVENOUS at 00:16

## 2025-05-25 RX ADMIN — SIMETHICONE 80 MG: 80 TABLET, CHEWABLE ORAL at 00:22

## 2025-05-25 RX ADMIN — OXYCODONE HYDROCHLORIDE 5 MG: 5 SOLUTION ORAL at 03:52

## 2025-05-25 RX ADMIN — HEPARIN SODIUM 5000 UNITS: 5000 INJECTION, SOLUTION INTRAVENOUS; SUBCUTANEOUS at 14:20

## 2025-05-25 RX ADMIN — ACETAMINOPHEN 1000 MG: 1000 INJECTION, SOLUTION INTRAVENOUS at 00:16

## 2025-05-25 RX ADMIN — KETOROLAC TROMETHAMINE 30 MG: 30 INJECTION, SOLUTION INTRAMUSCULAR at 09:29

## 2025-05-25 RX ADMIN — SODIUM CHLORIDE, POTASSIUM CHLORIDE, SODIUM LACTATE AND CALCIUM CHLORIDE 150 ML/HR: 600; 310; 30; 20 INJECTION, SOLUTION INTRAVENOUS at 00:16

## 2025-05-25 RX ADMIN — ACETAMINOPHEN 1000 MG: 10 INJECTION, SOLUTION INTRAVENOUS at 13:24

## 2025-05-25 RX ADMIN — METOCLOPRAMIDE 10 MG: 5 INJECTION, SOLUTION INTRAMUSCULAR; INTRAVENOUS at 18:27

## 2025-05-25 RX ADMIN — FUROSEMIDE 20 MG: 10 INJECTION, SOLUTION INTRAMUSCULAR; INTRAVENOUS at 09:29

## 2025-05-25 RX ADMIN — ACETAMINOPHEN 1000 MG: 10 INJECTION, SOLUTION INTRAVENOUS at 18:58

## 2025-05-25 RX ADMIN — HEPARIN SODIUM 5000 UNITS: 5000 INJECTION, SOLUTION INTRAVENOUS; SUBCUTANEOUS at 05:32

## 2025-05-25 RX ADMIN — KETOROLAC TROMETHAMINE 30 MG: 30 INJECTION, SOLUTION INTRAMUSCULAR at 18:27

## 2025-05-25 RX ADMIN — SODIUM CHLORIDE, POTASSIUM CHLORIDE, SODIUM LACTATE AND CALCIUM CHLORIDE 75 ML/HR: 600; 310; 30; 20 INJECTION, SOLUTION INTRAVENOUS at 19:02

## 2025-05-25 RX ADMIN — METOCLOPRAMIDE 10 MG: 5 INJECTION, SOLUTION INTRAMUSCULAR; INTRAVENOUS at 13:22

## 2025-05-25 RX ADMIN — SODIUM CHLORIDE, POTASSIUM CHLORIDE, SODIUM LACTATE AND CALCIUM CHLORIDE 75 ML/HR: 600; 310; 30; 20 INJECTION, SOLUTION INTRAVENOUS at 09:29

## 2025-05-25 RX ADMIN — SODIUM CHLORIDE, POTASSIUM CHLORIDE, SODIUM LACTATE AND CALCIUM CHLORIDE 150 ML/HR: 600; 310; 30; 20 INJECTION, SOLUTION INTRAVENOUS at 05:31

## 2025-05-25 RX ADMIN — MAGNESIUM SULFATE HEPTAHYDRATE 2 G: 2 INJECTION, SOLUTION INTRAVENOUS at 14:20

## 2025-05-25 RX ADMIN — PANTOPRAZOLE SODIUM 40 MG: 40 INJECTION, POWDER, FOR SOLUTION INTRAVENOUS at 05:32

## 2025-05-25 RX ADMIN — ACETAMINOPHEN 1000 MG: 1000 INJECTION, SOLUTION INTRAVENOUS at 05:35

## 2025-05-25 RX ADMIN — SIMETHICONE 80 MG: 80 TABLET, CHEWABLE ORAL at 09:29

## 2025-05-25 ASSESSMENT — PAIN - FUNCTIONAL ASSESSMENT
PAIN_FUNCTIONAL_ASSESSMENT: 0-10
PAIN_FUNCTIONAL_ASSESSMENT: 0-10

## 2025-05-25 ASSESSMENT — COGNITIVE AND FUNCTIONAL STATUS - GENERAL
DAILY ACTIVITIY SCORE: 24
MOBILITY SCORE: 24
DAILY ACTIVITIY SCORE: 24
MOBILITY SCORE: 24

## 2025-05-25 ASSESSMENT — PAIN SCALES - GENERAL
PAINLEVEL_OUTOF10: 4
PAINLEVEL_OUTOF10: 2
PAINLEVEL_OUTOF10: 3

## 2025-05-25 ASSESSMENT — PAIN DESCRIPTION - DESCRIPTORS: DESCRIPTORS: ACHING

## 2025-05-25 ASSESSMENT — PAIN DESCRIPTION - LOCATION: LOCATION: ABDOMEN

## 2025-05-25 NOTE — PROGRESS NOTES
"Lula Del Angel is a 40 y.o. female on day 1 of admission presenting with Bariatric surgery status.    Subjective   Patient seen and examined this morning.  She did a little better with her p.o. intake yesterday, but still not meeting fluid goals.  She does have nausea when she drinks.  Also states that the oxycodone makes her nauseous.     Objective     Physical Exam  Physical Exam:   Constitutional: Well developed, awake/alert/oriented x3, no distress, alert and cooperative  Eyes: PERRL, EOMI, clear sclera  Head/Neck: Neck supple, no apparent injury, No JVD, trachea midline  Respiratory/Thorax: good chest expansion, thorax symmetric  Cardiovascular: Regular, rate and rhythm,  2+ equal pulses of the extremities  Gastrointestinal: Nondistended, soft, minimally tender, no rebound tenderness or guarding, no masses palpable, incisions c/d/i  Musculoskeletal: ROM intact, no joint swelling, normal strength  Extremities: normal extremities, no cyanosis edema, contusions or wounds, no clubbing  Neurological: alert and oriented x3, intact senses,  Psychological: Appropriate mood and behavior  Skin: Warm and dry, no lesions, no rashes      Last Recorded Vitals  Blood pressure 139/64, pulse 57, temperature 36 °C (96.8 °F), temperature source Temporal, resp. rate 18, height 1.626 m (5' 4\"), weight 126 kg (277 lb 14.4 oz), SpO2 98%.    Intake/Output last 3 Shifts:  I/O last 3 completed shifts:  In: 3602.5 (28.6 mL/kg) [P.O.:750; I.V.:2352.5 (18.7 mL/kg); IV Piggyback:500]  Out: 2375 (18.8 mL/kg) [Urine:2375 (0.5 mL/kg/hr)]  Weight: 126.1 kg     Assessment: 40-year-old female who is post-op day 3 from a laparoscopic sleeve gastrectomy.  Her nausea has improved, though she is having pressure-like sensations with p.o. intake.  Plan:  - Standing Reglan, will consider switching to Phenergan if her nausea persists  - Zofran as needed  - Out of bed and ambulate  - DVT prophylaxis  - Encourage PO intake  - Will discontinue oxycodone, " will start tramadol as needed  - Toradol standing for pain    I spent 60 minutes in the professional and overall care of this patient.    Don Martins MD

## 2025-05-26 VITALS
RESPIRATION RATE: 16 BRPM | OXYGEN SATURATION: 97 % | DIASTOLIC BLOOD PRESSURE: 71 MMHG | TEMPERATURE: 97.9 F | HEIGHT: 64 IN | HEART RATE: 62 BPM | SYSTOLIC BLOOD PRESSURE: 145 MMHG | WEIGHT: 277.2 LBS | BODY MASS INDEX: 47.33 KG/M2

## 2025-05-26 LAB
ALBUMIN SERPL BCP-MCNC: 3.4 G/DL (ref 3.4–5)
ALP SERPL-CCNC: 43 U/L (ref 33–110)
ALT SERPL W P-5'-P-CCNC: 9 U/L (ref 7–45)
ANION GAP SERPL CALC-SCNC: 16 MMOL/L (ref 10–20)
AST SERPL W P-5'-P-CCNC: 8 U/L (ref 9–39)
BASOPHILS # BLD AUTO: 0.02 X10*3/UL (ref 0–0.1)
BASOPHILS NFR BLD AUTO: 0.3 %
BILIRUB SERPL-MCNC: 0.5 MG/DL (ref 0–1.2)
BUN SERPL-MCNC: 8 MG/DL (ref 6–23)
CALCIUM SERPL-MCNC: 8.2 MG/DL (ref 8.6–10.3)
CHLORIDE SERPL-SCNC: 103 MMOL/L (ref 98–107)
CO2 SERPL-SCNC: 22 MMOL/L (ref 21–32)
CREAT SERPL-MCNC: 0.48 MG/DL (ref 0.5–1.05)
EGFRCR SERPLBLD CKD-EPI 2021: >90 ML/MIN/1.73M*2
EOSINOPHIL # BLD AUTO: 0.35 X10*3/UL (ref 0–0.7)
EOSINOPHIL NFR BLD AUTO: 5.5 %
ERYTHROCYTE [DISTWIDTH] IN BLOOD BY AUTOMATED COUNT: 13.7 % (ref 11.5–14.5)
GLUCOSE SERPL-MCNC: 66 MG/DL (ref 74–99)
HCT VFR BLD AUTO: 34.6 % (ref 36–46)
HGB BLD-MCNC: 12.2 G/DL (ref 12–16)
IMM GRANULOCYTES # BLD AUTO: 0.01 X10*3/UL (ref 0–0.7)
IMM GRANULOCYTES NFR BLD AUTO: 0.2 % (ref 0–0.9)
LYMPHOCYTES # BLD AUTO: 1.11 X10*3/UL (ref 1.2–4.8)
LYMPHOCYTES NFR BLD AUTO: 17.5 %
MAGNESIUM SERPL-MCNC: 1.83 MG/DL (ref 1.6–2.4)
MCH RBC QN AUTO: 30 PG (ref 26–34)
MCHC RBC AUTO-ENTMCNC: 35.3 G/DL (ref 32–36)
MCV RBC AUTO: 85 FL (ref 80–100)
MONOCYTES # BLD AUTO: 0.33 X10*3/UL (ref 0.1–1)
MONOCYTES NFR BLD AUTO: 5.2 %
NEUTROPHILS # BLD AUTO: 4.51 X10*3/UL (ref 1.2–7.7)
NEUTROPHILS NFR BLD AUTO: 71.3 %
NRBC BLD-RTO: 0 /100 WBCS (ref 0–0)
PHOSPHATE SERPL-MCNC: 3.6 MG/DL (ref 2.5–4.9)
PLATELET # BLD AUTO: 236 X10*3/UL (ref 150–450)
POTASSIUM SERPL-SCNC: 3.5 MMOL/L (ref 3.5–5.3)
PROT SERPL-MCNC: 6.1 G/DL (ref 6.4–8.2)
RBC # BLD AUTO: 4.07 X10*6/UL (ref 4–5.2)
SODIUM SERPL-SCNC: 137 MMOL/L (ref 136–145)
WBC # BLD AUTO: 6.3 X10*3/UL (ref 4.4–11.3)

## 2025-05-26 PROCEDURE — 99024 POSTOP FOLLOW-UP VISIT: CPT | Performed by: STUDENT IN AN ORGANIZED HEALTH CARE EDUCATION/TRAINING PROGRAM

## 2025-05-26 PROCEDURE — 36415 COLL VENOUS BLD VENIPUNCTURE: CPT | Performed by: SURGERY

## 2025-05-26 PROCEDURE — 85025 COMPLETE CBC W/AUTO DIFF WBC: CPT | Performed by: SURGERY

## 2025-05-26 PROCEDURE — 2500000004 HC RX 250 GENERAL PHARMACY W/ HCPCS (ALT 636 FOR OP/ED): Mod: JZ | Performed by: SURGERY

## 2025-05-26 PROCEDURE — 83735 ASSAY OF MAGNESIUM: CPT | Performed by: SURGERY

## 2025-05-26 PROCEDURE — 80053 COMPREHEN METABOLIC PANEL: CPT | Performed by: SURGERY

## 2025-05-26 PROCEDURE — 84100 ASSAY OF PHOSPHORUS: CPT | Performed by: SURGERY

## 2025-05-26 PROCEDURE — 2500000004 HC RX 250 GENERAL PHARMACY W/ HCPCS (ALT 636 FOR OP/ED): Performed by: STUDENT IN AN ORGANIZED HEALTH CARE EDUCATION/TRAINING PROGRAM

## 2025-05-26 RX ORDER — ONDANSETRON 4 MG/1
4 TABLET, ORALLY DISINTEGRATING ORAL EVERY 8 HOURS PRN
Qty: 30 TABLET | Refills: 0 | Status: SHIPPED | OUTPATIENT
Start: 2025-05-26 | End: 2025-06-25

## 2025-05-26 RX ORDER — TRAMADOL HYDROCHLORIDE 50 MG/1
50 TABLET, FILM COATED ORAL EVERY 6 HOURS PRN
Qty: 20 TABLET | Refills: 0 | Status: SHIPPED | OUTPATIENT
Start: 2025-05-26 | End: 2025-05-31

## 2025-05-26 RX ORDER — TRAMADOL HYDROCHLORIDE 50 MG/1
50 TABLET, FILM COATED ORAL EVERY 6 HOURS PRN
Qty: 20 TABLET | Refills: 0 | Status: SHIPPED | OUTPATIENT
Start: 2025-05-26 | End: 2025-05-26

## 2025-05-26 RX ADMIN — PANTOPRAZOLE SODIUM 40 MG: 40 INJECTION, POWDER, FOR SOLUTION INTRAVENOUS at 09:32

## 2025-05-26 RX ADMIN — METOCLOPRAMIDE 10 MG: 5 INJECTION, SOLUTION INTRAMUSCULAR; INTRAVENOUS at 05:30

## 2025-05-26 RX ADMIN — HEPARIN SODIUM 5000 UNITS: 5000 INJECTION, SOLUTION INTRAVENOUS; SUBCUTANEOUS at 05:30

## 2025-05-26 RX ADMIN — KETOROLAC TROMETHAMINE 30 MG: 30 INJECTION, SOLUTION INTRAMUSCULAR at 05:30

## 2025-05-26 RX ADMIN — KETOROLAC TROMETHAMINE 30 MG: 30 INJECTION, SOLUTION INTRAMUSCULAR at 00:22

## 2025-05-26 RX ADMIN — METOCLOPRAMIDE 10 MG: 5 INJECTION, SOLUTION INTRAMUSCULAR; INTRAVENOUS at 00:22

## 2025-05-26 RX ADMIN — ACETAMINOPHEN 1000 MG: 10 INJECTION, SOLUTION INTRAVENOUS at 00:22

## 2025-05-26 ASSESSMENT — COGNITIVE AND FUNCTIONAL STATUS - GENERAL
DAILY ACTIVITIY SCORE: 24
MOBILITY SCORE: 24

## 2025-05-26 ASSESSMENT — PAIN - FUNCTIONAL ASSESSMENT: PAIN_FUNCTIONAL_ASSESSMENT: 0-10

## 2025-05-26 ASSESSMENT — PAIN SCALES - GENERAL: PAINLEVEL_OUTOF10: 0 - NO PAIN

## 2025-05-26 NOTE — DISCHARGE SUMMARY
Discharge Diagnosis  Bariatric surgery status    Issues Requiring Follow-Up  Routine post-op follow up     Test Results Pending At Discharge  Pending Labs       Order Current Status    Extra Tubes In process    Lavender Top In process    Surgical Pathology Exam In process          Hospital Course  Patient presented to the hospital for scheduled sleeve gastrectomy. The procedure was carried out without complications. No issues POD#0. Post operative esophogram was negative for leak or obstruction. Diet was advanced POD#1. Pain was adequately controlled but patient had significant nausea. Her nausea improved and patient was discharged home on POD#4 once tolerating 5-8 oz of liquid diet per hour.    Pertinent Physical Exam At Time of Discharge  Physical Exam  Patient is in no acute distress  No respiratory distress  Abdomen is soft, not tender, not distended  No guarding   Incisions are clean dry and intact  Patient is alert and oriented x 3    Home Medications     Medication List      START taking these medications     omeprazole 40 mg DR capsule; Commonly known as: PriLOSEC; Take 1 capsule   (40 mg) by mouth once daily in the morning. Take before meals. Please   remove capsule and use granules and mix with sugar free pudding or jello   ondansetron ODT 4 mg disintegrating tablet; Commonly known as:   Zofran-ODT; Dissolve 1 tablet (4 mg) in the mouth every 8 hours if needed   for nausea or vomiting.   traMADol 50 mg tablet; Commonly known as: Ultram; Take 1 tablet (50 mg)   by mouth every 6 hours if needed for severe pain (7 - 10) for up to 5   days.     CONTINUE taking these medications     triamterene-hydrochlorothiazid 37.5-25 mg capsule; Commonly known as:   Dyazide; Take 1 capsule by mouth once daily.     STOP taking these medications     furosemide 20 mg tablet; Commonly known as: Lasix   saccharomyces boulardii 250 mg capsule; Commonly known as: Florastor       Outpatient Follow-Up  Future Appointments   Date  Time Provider Department [place]   5/29/2025 10:00 AM Shelby Thomason RDN, MARIS [id number] [facility]   6/9/2025  1:45 PM Hilary Wolf MD MPH [id number] [facility]   7/2/2025 11:00 AM Shelby Thomason RDN, MARIS [id number] [facility]   7/3/2025 11:00 AM EMILY Martinez [id number] [facility]   8/13/2025 11:00 AM Shelby Thomason RDN, MARIS [id number] [facility]   8/14/2025 11:00 AM EMILY Martinez [id number] [facility]       Jeff Sam MD

## 2025-05-26 NOTE — PROGRESS NOTES
05/26/25 1000   Discharge Planning   Expected Discharge Disposition Home  (Pt dc home today and denies home going needs. Spouse and mother on way now to transport home once dc complete)

## 2025-05-26 NOTE — CARE PLAN
Problem: Pain  Goal: Takes deep breaths with improved pain control throughout the shift  5/25/2025 2233 by Gill Garzon RN  Outcome: Progressing  5/25/2025 2233 by Gill Garzon RN  Outcome: Progressing  Goal: Turns in bed with improved pain control throughout the shift  5/25/2025 2233 by Gill Garzon RN  Outcome: Progressing  5/25/2025 2233 by Gill Garzon RN  Outcome: Progressing  Goal: Walks with improved pain control throughout the shift  5/25/2025 2233 by Gill Garzon RN  Outcome: Progressing  5/25/2025 2233 by Gill Garzon RN  Outcome: Progressing  Goal: Performs ADL's with improved pain control throughout shift  5/25/2025 2233 by Gill Garzon RN  Outcome: Progressing  5/25/2025 2233 by Gill Garzon RN  Outcome: Progressing  Goal: Participates in PT with improved pain control throughout the shift  5/25/2025 2233 by Gill Garzon RN  Outcome: Progressing  5/25/2025 2233 by Gill Garzon RN  Outcome: Progressing  Goal: Free from opioid side effects throughout the shift  5/25/2025 2233 by Gill Garzon RN  Outcome: Progressing  5/25/2025 2233 by Gill Garzon RN  Outcome: Progressing  Goal: Free from acute confusion related to pain meds throughout the shift  5/25/2025 2233 by Gill Garzon RN  Outcome: Progressing  5/25/2025 2233 by Gill Garzon RN  Outcome: Progressing     Problem: Pain - Adult  Goal: Verbalizes/displays adequate comfort level or baseline comfort level  5/25/2025 2233 by Gill Garzon RN  Outcome: Progressing  5/25/2025 2233 by Gill Garzon RN  Outcome: Progressing     Problem: Safety - Adult  Goal: Free from fall injury  5/25/2025 2233 by Gill Garzon RN  Outcome: Progressing  5/25/2025 2233 by Gill Garzon RN  Outcome: Progressing     Problem: Discharge Planning  Goal: Discharge to home or other facility with appropriate resources  5/25/2025 2233 by Gill Garzon RN  Outcome: Progressing  5/25/2025 2233 by Gill Garzon RN  Outcome: Progressing     Problem: Chronic Conditions and  Co-morbidities  Goal: Patient's chronic conditions and co-morbidity symptoms are monitored and maintained or improved  5/25/2025 2233 by Gill Garzon RN  Outcome: Progressing  5/25/2025 2233 by Gill Garzon RN  Outcome: Progressing     Problem: Nutrition  Goal: Nutrient intake appropriate for maintaining nutritional needs  5/25/2025 2233 by Gill Garzon RN  Outcome: Progressing  5/25/2025 2233 by Gill Garzon RN  Outcome: Progressing   The patient's goals for the shift include      The clinical goals for the shift include pain control    Over the shift, the patient did not make progress toward the following goals. Barriers to progression include pain. Recommendations to address these barriers include meds.

## 2025-05-26 NOTE — NURSING NOTE
Discharge teaching was done. Pt states there are no questions are this time. AVS was given to patient. IV was taken out. She walked off of unit on her own with family.

## 2025-05-27 ENCOUNTER — PHARMACY VISIT (OUTPATIENT)
Dept: PHARMACY | Facility: CLINIC | Age: 40
End: 2025-05-27
Payer: MEDICARE

## 2025-05-27 LAB — HOLD SPECIMEN: NORMAL

## 2025-05-27 PROCEDURE — RXMED WILLOW AMBULATORY MEDICATION CHARGE

## 2025-05-29 ENCOUNTER — APPOINTMENT (OUTPATIENT)
Dept: SURGERY | Facility: CLINIC | Age: 40
End: 2025-05-29
Payer: COMMERCIAL

## 2025-05-29 ENCOUNTER — NUTRITION (OUTPATIENT)
Dept: SURGERY | Facility: CLINIC | Age: 40
End: 2025-05-29
Payer: COMMERCIAL

## 2025-05-29 NOTE — SIGNIFICANT EVENT
Follow Up Phone Call    Outgoing phone call    Spoke to: Lula Del Angel Relationship:self   Phone number: [phone]      Outcome: contacted patient/ family   No chief complaint on file.         Diagnosis:Not applicable    Reviewed the importance of meeting protein and fluid goals, patient states she is almost meeting goals. Bowels are not moving. Pain is managed. She is ambulating. Denies fever,chills, nausea or vomiting. No further questions or concerns.

## 2025-05-29 NOTE — PROGRESS NOTES
Follow Up Bariatric Nutrition Assessment    Name: Lula Del Angel  MRN: [id number]  Date: 05/29/25    Surgery Date:  5/22/25  Surgeon:  Luciano   Procedure:  Sleeve gastrectomy     ASSESSMENT:  Current weight: n/a  Previous weight:  273lbs  Initial start weight:  330lbs  EBW: 180lbs    PROGRESS:  Nutrition Interventions for last encounter (date):   Drink 64 oz of fluid daily  Drink enough protein shakes to meet your goal of 60-70 g of protein per day  Take 2 chewable multivitamins, 7414-8364 mg of calcium citrate, and 500 mcg of B12 daily  Get up and walk frequently throughout the day.     CHANGES IN TREATMENT:   Patient met goals:     Partially      24 hour food recall:   Beverages: [employer] shake (30g), greek yogurt, water, protein soup, SF pudding/JellO/popsicles   Alcohol: no       Vitamins:  Bariatric Advantage MVI w/B12, Bariatric advantage (250mg each)  500 mg Calcium -has no yet start  Medications: Current Medications[1]    Physical Activity: walking around     READINESS TO LEARN:  Motivation to learn:   Interested      Understanding of instruction: Good   Anticipated Compliance:   Good    Family Support: Unable to assess-family not present     Patient presents with post-op weight loss surgery: sleeve gastrectomy. The pt is 1 week post op. Patient tolerating liquid diet.  Protein intake is almost adequate for post-op individual. Has been hitting ~55g of protein. Fluid consumption is adequate. Patient is not yet supplementing recommended vitamin/minerals. Pt states concerns and/or difficulties with her long hospital admission but has been feeling better since she has been home.   Reviewed the puree. Reminded pt. to eat slowly, chew thoroughly and to try one new food at a time.    Malnutrition Screening  Significant unintentional weight loss? n/a  Eating less than 75% of usual intake for more than 2 weeks? n/a      Nutrition Diagnosis:   Increased protein and nutrition needs related to altered GI function as  evidenced by pt. s/p sleeve gastrectomy.  Food- and nutrition-related knowledge deficit related to lack of prior exposure to surgical weight loss information as evidenced by diet recall.     Nutrition Interventions:   Modify type and amount of food and nutrients within meals and snacks.  Comprehensive Nutrition Education  -Nutrition education materials: none      Recommendations:    Continue to drink your protein shakes to meet your goal of 60-70 g of protein per day. Begin measuring how much protein you can eat on the soft diet so that you know when to start weaning off of your protein shakes.   Continue to drink 64 oz. of zero calorie beverages per day  Continue no drinking 30 min before, during the meal and for 30 minutes after the meal  Continue to exercise   Advance to the puree diet on 6/5, then soft food on 6/19  Remember to eat slowly and chew thoroughly  Try one new food at a time to test for any intolerances.   Continue to take all of your vitamins and minerals.     Nutrition Monitoring and Evaluation:   1-2 pounds weight loss per week  Criteria: weight check, food recall  Need for Follow-up: 6 weeks post op     Shelby Thomason MS, RD, LD  Phone: [phone]         [1]   Current Outpatient Medications:     omeprazole (PriLOSEC) 40 mg DR capsule, Take 1 capsule (40 mg) by mouth once daily in the morning. Take before meals. Please remove capsule and use granules and mix with sugar free pudding or jello, Disp: 30 capsule, Rfl: 5    ondansetron ODT (Zofran-ODT) 4 mg disintegrating tablet, Dissolve 1 tablet (4 mg) in the mouth every 8 hours if needed for nausea or vomiting., Disp: 30 tablet, Rfl: 0    traMADol (Ultram) 50 mg tablet, Take 1 tablet (50 mg) by mouth every 6 hours if needed for severe pain (7 - 10) for up to 5 days., Disp: 20 tablet, Rfl: 0    triamterene-hydrochlorothiazid (Dyazide) 37.5-25 mg capsule, Take 1 capsule by mouth once daily., Disp: 90 capsule, Rfl: 1

## 2025-06-02 ENCOUNTER — TELEPHONE (OUTPATIENT)
Dept: PRIMARY CARE | Facility: CLINIC | Age: 40
End: 2025-06-02
Payer: COMMERCIAL

## 2025-06-02 NOTE — TELEPHONE ENCOUNTER
Pt called [phone]  she had gastric sleeve surgery 5/22, yesterday she was dizzy just started back on BP RX Triamterene-Hydrochlorothiazide 37.5-25 mg she did not take today as did not want to be dizzy, no other SX, her BP today was 118/76 no dizziness today, should she continue taking ?

## 2025-06-05 ENCOUNTER — TELEPHONE (OUTPATIENT)
Dept: SURGERY | Facility: CLINIC | Age: 40
End: 2025-06-05
Payer: COMMERCIAL

## 2025-06-05 NOTE — PROGRESS NOTES
"WEIGHT LOSS SURGERY CLINIC  2 WEEKS FOLLOW UP NOTE    CLINIC DATE:  6/9/25    NAME:  Camden Pop y.o.  MRN: [id number]    DATE OF SURGERY:  5/22/2025   SURGICAL PROCEDURE5:  Laparoscopic sleeve gastrectomy  08255    EXTRA PROCEDURES:  None    COMPLICATIONS DURING ADMISSION: None    INITIAL WEIGHT:  326 LBS  PRE-SURGICAL WEIGHT: 271 LBS  THIS VISIT WEIGHT / BMI: 247 LBS/BMI: 42.40  Wt Readings from Last 1 Encounters:   05/26/25 126 kg (277 lb 3.2 oz)      BMI Readings from Last 1 Encounters:   05/26/25 47.58 kg/m²     WEIGHT / BMI HX:    Wt Readings from Last 3 Encounters:   05/26/25 126 kg (277 lb 3.2 oz)   05/05/25 123 kg (271 lb 9.6 oz)   05/05/25 123 kg (271 lb 2.7 oz)      BMI Readings from Last 3 Encounters:   05/26/25 47.58 kg/m²   05/05/25 46.62 kg/m²   05/05/25 46.55 kg/m²        TOTAL WEIGHT LOSS:  24 LBS    SURGEON:  Dr. Hilary Wolf    PRESENTING TODAY FOR BARIATRIC POST-OP VISIT:  2 Weeks.  This LPN spoke with on *LVM 6/6/25.  Feeling much better than did 2 weeks ago.  Getting hydration and tolerating diet and now much happier and feeling better.     CURRENT SYMPTOMS:      Abdominal Pain: No    Back Pain: No              Bloating:  Yes     Burping: Yes    Constipation: No    Diarrhea: No    Hiccups: No    Hunger: Yes    Nausea: No    Vomiting: No    Calf/Thigh pain or swelling: No  Cough / Wheezing: No  Decreased Urine Output: No  Fever/Chills: No  Incisions Drainage, Open Areas, Redness, Warmth:  Yes, \"a little warmth\"  Increased Heart Rate: No  Shortness of Breath: No    EATING HABITS HISTORY:     Pureed    Carbonated Beverages: No          Caffeinated Beverages: No    Time To Eat Meals 30 Minutes: Yes     Fluid Intake 60-64 oz/day: Yes       Protein Intake 60 grams/day: Yes     Breakfast: protein shake     Lunch: yogurt     Dinner: ground turkey    Snacks: yogurt     GERD - Health Related Quality of Life Questionnaire (GERD- HRQL)  On PPI  Omeprazole 40 mg Daily     How bad is the heartburn? 2 = " Symptoms noticeable and bothersome but not every day  Heartburn when lying down? 2 = Symptoms noticeable and bothersome but not every day  Heartburn when standing up? 2 = Symptoms noticeable and bothersome but not every day  Heartburn after meals? 2 = Symptoms noticeable and bothersome but not every day  Does heartburn change your diet? 1 = Symptoms noticeable but not bothersome  Does heartburn wake you from sleep? 0 = No symptoms    Do you have difficulty swallowing? 0 = No symptoms  Do you have pain with swallowing? 0 = No symptoms  If you take medication, does this affect your daily life? 0 = No symptoms    How bad is the regurgitation? 0 = No symptoms  Regurgitation when lying down? 0 = No symptoms  Regurgitation when standing up? 0 = No symptoms  Regurgitation after meals? 0 = No symptoms  Does regurgitation change your diet? 0 = No symptoms  Does regurgitation wake you from sleep? 0 = No symptoms    How satisfied are you with your present condition? Satisfied    Total score (calculated by summing the individual scores of questions 1-15): 9   Greatest possible score 75 (worst symptoms).   Lowest possible score 0 (no symptoms).    Heartburn score (calculated by summing the individual scores of questions 1-6): 9   Worst heartburn symptoms: 30.   No heartburn symptoms: 0.   Score less than or equal to 12 with each individual question not exceeding 2 indicate heartburn elimination.    Regurgitation score (calculated by summing the individual scores of questions 10-15): 0   Worst regurgitation symptoms: 30.   No regurgitation symptoms: 0.   Score less than or equal to 12 with each individual question not exceeding 2 indicate regurgitation elimination.     EXERCISE: Yes    PROVIDER LAST IMPRESSIONS VISIT NOTE:        Lula Del Angel is a 40 y.o. y.o. female with a BMI of Body mass index is 46.62 kg/m².. : htn and can benefits from weight loss.  We discussed the sleeve at length.  She is a great candidate.  She has a  supportive  who also had gastric bypass.  We discussed focused exercise. She is low risk for surgery.  She has never had surgery.   When I first met her, bmi was 54.  She has made multiple lifestyle changes and is eager to proceed.  She has already lost 66 lbs.  She is on 2 dieuretics.  Uses lasix only prn.    The risks of Laparoscopic sleeve gastrectomy  58072 surgery including bleeding, leak, wound infection, dehydration, ulcers, internal hernia, DVT/PE, prolonged nausea/vomiting, incomplete resolution of associated medical conditions, reflux, weight regain, vitamin/mineral deficiencies, and death have been explained to the patient and Lula Del Angel has expressed understanding and acceptance of them.     The benefits of the above surgery including weight loss, improvement/resolution of associated medical and mental health conditions, improved mobility, and decreased mortality have been explained the the patient and Lula Del Angel has expressed understanding and acceptance of them.  The patient understands the risks and benefits of the procedure and how the procedure is performed. The patient understands the risks include but are not limited to bleeding, infection, DVT, PE, pneumonia, myocardial infarction, leak along the staple lines, and weight regain. We discussed lifestyle changes necessary to be successful.   This patient does meet the criteria for a surgical weight loss procedure according to NIH guidelines.  The risks of sleeve gastrectomy, Asya-en-Y gastric bypass, and duodenal switch surgery including bleeding, leak, wound infection, dehydration, ulcers, internal hernia, DVT/PE, prolonged nausea/vomiting, incomplete resolution of associated medical conditions, reflux, weight regain, vitamin/mineral deficiencies, and death have been explained to the patient and has expressed understanding and acceptance of them.   The increased risk of substance and alcohol abuse following bariatric surgery was  discussed with the patient, along with the negative consequences of substance/alcohol use after surgery including addiction, worsening of mental health disorders, and injury to the stomach. The risk of smoking and vaping (tobacco or any other substance) after bariatric surgery was explained to the patient. This includes risk of anastamotic ulcers, gastritis, bleeding, perforation, stricture, and PO intolerance.  The patient expressed understanding and acceptance of these risks.   They have been preoperatively evaluated and deemed to be an appropriate candidate for bariatric surgery.  Surgery Type: Laparoscopic sleeve gastrectomy  78670     All testing reviewed.  All clearances contained.     PLAN:  You are scheduled for a sleeve on      You will receive a phone call the day prior to surgery with your OR arrival time.     Be sure to read over your Pre-Op book for final preparation for surgery.     Make a shopping list and  your supplements prior to surgery.     You have been provided with a pain script today- TAKE THIS TODAY to your pharmacy as it may need prior authorization.     Electronic prescriptions were sent to your pharmacy. Prescriptions for Omeprazole (anacid), Enoxaparin (DVT Prophylaxis) and Ondansetron (anti-nausea) have been sent to your retail pharmacy. You will likely get these medications in the hospital.  If not you can Pick these up after surgery.     Be sure to take the omeprazole, open the capsule and sprinkle over SF applesauce and take every day for 6 months after surgery. DO NOT MISS A DOSE. This will help you to heal.     Call with any questions! [phone] gloria Richardson     1) Reread your pre op manual to be familiar with pre op and post op expectations     2) Follow the pre op diet exactly so your liver shrinks down prior to your surgery:      -DAY BEFORE SURGERY: NO SOLID FOODS! You may have up to 3 protein shakes BEFORE 3pm. After 3pm, you may only have CLEAR, non-caloric liquids  w/ NO red dye.   -DAY OF SURGERY: Do not eat or drink anything unless specified by the doctor.     3) Obtain the over the counter items you need in the home prior to your surgery     4) Be certain you have an appointment with your medical doctors who will need to tweak your prescription medications after surgery as you drop weight rapidly.       5) Make sure you have follow up appointments 2 weeks after surgery and at the 6 week elliot     6) --Family Medical Leave paperwork is available through your human resources department. Please call your human resources department to obtain the paperwork. You must allow up to two weeks for paperwork to be completed. You will need to fill out a portion of the paperwork before faxing it to our office for completion. In addition, indicate what kind of leave you are requesting (intermittent or continuous) and how long you are requesting the leave extend. Please fax the paperwork to [phone]   Operative and blood transfusion consent forms were signed by the patient and witnessed today.     Prescriptions for all required post-operative home medications were sent to the [facility] pharmacy today and will be delivered to the patient's bedside on the day of discharge.     Further education was provided on day of surgery instructions and what to expect from the inpatient admission after surgery.                Electronically signed by Hilary Wolf MD MPH at 5/5/2025 11:10 AM    CURRENT MEDICATIONS:  Current Medications[1]    PAST MEDICAL HISTORY:    Problem List[2]    PAST SURGICAL HISTORY:  Surgical History[3]    FAMILY HISTORY:  Family History[4]    PAST SOCIAL HX:  Social History     Socioeconomic History    Marital status:      Spouse name: Not on file    Number of children: Not on file    Years of education: Not on file    Highest education level: Not on file   Occupational History    Not on file   Tobacco Use    Smoking status: Former     Current packs/day:  0.00     Types: Cigarettes     Quit date: 2017     Years since quittin.5    Smokeless tobacco: Never    Tobacco comments:     Quit in 2017. Smoked about 1/3 pack per day for 15 years   Vaping Use    Vaping status: Never Used   Substance and Sexual Activity    Alcohol use: Not Currently    Drug use: Never    Sexual activity: Not Currently     Partners: Male     Birth control/protection: Abstinence   Other Topics Concern    Not on file   Social History Narrative    Not on file     Social Drivers of Health     Financial Resource Strain: Low Risk  (2025)    Overall Financial Resource Strain (CARDIA)     Difficulty of Paying Living Expenses: Not hard at all   Food Insecurity: No Food Insecurity (2025)    Hunger Vital Sign     Worried About Running Out of Food in the Last Year: Never true     Ran Out of Food in the Last Year: Never true   Transportation Needs: No Transportation Needs (2025)    PRAPARE - Transportation     Lack of Transportation (Medical): No     Lack of Transportation (Non-Medical): No   Physical Activity: Not on file   Stress: Not on file   Social Connections: Not on file   Intimate Partner Violence: Not At Risk (2025)    Humiliation, Afraid, Rape, and Kick questionnaire     Fear of Current or Ex-Partner: No     Emotionally Abused: No     Physically Abused: No     Sexually Abused: No   Housing Stability: Low Risk  (2025)    Housing Stability Vital Sign     Unable to Pay for Housing in the Last Year: No     Number of Times Moved in the Last Year: 0     Homeless in the Last Year: No       ALLERGIES:  RX Allergies[5]    DAILY SUPPLEMENTS:  Calcium: Calcium Citrate w/ vitamin D (1200 - 1500mg)  Multivitamin & Minerals: 2 per day  Iron Supplement: included in multi-vitamin  Vitamin B12: 1000 mcg   Vitamin D3: 3000 units    REVIEW OF SYSTEMS:  CONSTITUTIONAL: Patient denies fevers, chills, sweats and weight changes.  EYES: Patient denies any visual symptoms.  EARS, NOSE, AND  THROAT: No difficulties with hearing. No symptoms of rhinitis or sore throat.  CARDIOVASCULAR: Patient denies chest pains, palpitations, orthopnea and paroxysmal nocturnal dyspnea.  RESPIRATORY: No dyspnea on exertion, no wheezing or cough.  GI: No nausea, vomiting, diarrhea, constipation, abdominal pain, hematochezia or melena.  : No urinary hesitancy or dribbling. No nocturia or urinary frequency. No abnormal urethral discharge.  MUSCULOSKELETAL: No myalgias or arthralgias.  NEUROLOGIC: No chronic headaches, no seizures. Patient denies numbness, tingling or weakness.  PSYCHIATRIC: Patient denies problems with mood disturbance. No problems with anxiety.  ENDOCRINE: No excessive urination or excessive thirst.  DERMATOLOGIC: Patient denies any rashes or skin changes.    PHYSICAL EXAM:  There were no vitals taken for this visit.  GENERAL: Obese. No apparent distress. Pt is alert and oriented x3.  HEENT: Head is normocephalic and atraumatic. Extraocular muscles are intact. Pupils are equal, round, and reactive to light and accommodation. Nares appeared normal. Mouth is well hydrated and without lesions. Mucous membranes are moist. Posterior pharynx clear of any exudate or lesions.  NECK: Supple. No carotid bruits. No lymphadenopathy or thyromegaly.  LUNGS: Clear to auscultation.  HEART: Regular rate and rhythm without murmur.  ABDOMEN: Soft, nontender, and nondistended. Positive bowel sounds. No hepatosplenomegaly was noted. Wounds healing well and dressings in place  EXTREMITIES: Without any cyanosis, clubbing, rash, lesions or edema.  NEUROLOGIC: Cranial nerves II through XII are grossly intact.  PSYCHIATRIC: Flat affect, but denies suicidal or homicidal ideations.  SKIN: No ulceration or induration present.      TODAY'S PROVIDER VISIT IMPRESSIONS:  Lula Del Angle is a 40 y.o. y.o. female with a BMI of Body mass index is 46.62 kg/m².. : htn and can benefits from weight loss.  We discussed the sleeve at length.  She  is a great candidate.  She has a supportive  who also had gastric bypass.  We discussed focused exercise. She is low risk for surgery.  She has never had surgery.   When I first met her, bmi was 54.  She has made multiple lifestyle changes and is eager to proceed.  She has already lost 66 lbs    She is now 2 weeks post sleeve overall oding well and counseled on purees.  Counseled on diet and exercise.   She has almost lost 100 lbs but her energy is still low.  Counseled to  keep on getting more nutrition.  Htn improved and not using her cpap.  Leg swelling improved.        Plan:       Activity: Increase activity as tolerated. If it hurts don't do it.  Otherwise you can do anything you want.       Counseling: Start Vitamins and Omeprazole if you haven't already.       Diet: Advance diet per Handbook/Dietician Instructions, Education on Pureed Diet Phase (spreadable consistency NO CHUNKS) and appropriate diet progression provided.  Eat 4-5 times/day.       Return to work: 2 weeks       Follow up: 4 weeks  You can peel the dressings off  YOU ARE DOING GREAT, you are 2 weeks post-op.    Things will get easier over time.   Work your way off the shakes and get all nutrition through food.  You may transition to pureed foods once your dietician advances your diet. Note: Purees are spreadable consistency NO CHUNKS,  you then can move to soft foods (able to mash with fork).  Continue to aim for 60 grams of protein and 64 oz of water daily.  Increase the duration and frequency of your exercise regimen as you are able.  Your goal is 1 hour/day .  Continue to take vitamin supplements as directed.  Remember to take your multivitamins 2 times a day, once in the morning and once in the evening.   Take you calcium 2-3 times a day, at least two hours apart from the multivitamin.  Come to support groups.  See the dietician as needed.  Schedule a follow-up visit in 4 weeks.     Dr. Hilary Wolf M.D., MPH  Director of Bariatric  & Minimally Invasive Surgery  [facility]  [address] [place], [place] [address]  T:  [phone]  F:  [phone]          [1]   Current Outpatient Medications   Medication Sig Dispense Refill    omeprazole (PriLOSEC) 40 mg DR capsule Take 1 capsule (40 mg) by mouth once daily in the morning. Take before meals. Please remove capsule and use granules and mix with sugar free pudding or jello 30 capsule 5    ondansetron ODT (Zofran-ODT) 4 mg disintegrating tablet Dissolve 1 tablet (4 mg) in the mouth every 8 hours if needed for nausea or vomiting. 30 tablet 0    triamterene-hydrochlorothiazid (Dyazide) 37.5-25 mg capsule Take 1 capsule by mouth once daily. 90 capsule 1     No current facility-administered medications for this visit.   [2]   Patient Active Problem List  Diagnosis    Essential (primary) hypertension    Obesity, morbid, BMI 50 or higher (Multi)    Bilateral leg edema    CLAYTON (obstructive sleep apnea)    Bariatric surgery status    Morbid (severe) obesity due to excess calories (Multi)   [3]   Past Surgical History:  Procedure Laterality Date    ESOPHAGOGASTRODUODENOSCOPY  03/21/2025    SLEEVE GASTROPLASTY  05/22/2025    Lap Sleeve w/Dr. Wolf at [facility]    WISDOM TOOTH EXTRACTION     [4]   Family History  Problem Relation Name Age of Onset    Hypertension Mother Pia     Colon polyps Mother Pia     Hypertension Father Aidan     Diabetes Father Aidan     Cancer Mother's Sister Isa     Cancer Maternal Grandmother Heidi    [5]   Allergies  Allergen Reactions    Avocado GI Upset    Ceclor [Cefaclor] Hives and Fever    Codeine Hives and Fever    Sulfa (Sulfonamide Antibiotics) Hives and Fever

## 2025-06-06 ENCOUNTER — TELEPHONE (OUTPATIENT)
Dept: SURGERY | Facility: CLINIC | Age: 40
End: 2025-06-06
Payer: COMMERCIAL

## 2025-06-09 ENCOUNTER — APPOINTMENT (OUTPATIENT)
Dept: SURGERY | Facility: CLINIC | Age: 40
End: 2025-06-09
Payer: COMMERCIAL

## 2025-06-09 VITALS
OXYGEN SATURATION: 91 % | SYSTOLIC BLOOD PRESSURE: 129 MMHG | BODY MASS INDEX: 42.4 KG/M2 | WEIGHT: 247 LBS | DIASTOLIC BLOOD PRESSURE: 90 MMHG | HEART RATE: 75 BPM

## 2025-06-09 DIAGNOSIS — I10 ESSENTIAL (PRIMARY) HYPERTENSION: ICD-10-CM

## 2025-06-09 PROBLEM — E66.01 OBESITY, MORBID, BMI 50 OR HIGHER (MULTI): Status: RESOLVED | Noted: 2024-05-29 | Resolved: 2025-06-09

## 2025-06-09 PROCEDURE — 3074F SYST BP LT 130 MM HG: CPT | Performed by: SURGERY

## 2025-06-09 PROCEDURE — 3080F DIAST BP >= 90 MM HG: CPT | Performed by: SURGERY

## 2025-06-09 PROCEDURE — 99024 POSTOP FOLLOW-UP VISIT: CPT | Performed by: SURGERY

## 2025-06-09 NOTE — PATIENT INSTRUCTIONS
Plan:       Activity: Increase activity as tolerated. If it hurts don't do it.  Otherwise you can do anything you want.       Counseling: Start Vitamins and Omeprazole if you haven't already.       Diet: Advance diet per Handbook/Dietician Instructions, Education on Pureed Diet Phase (spreadable consistency NO CHUNKS) and appropriate diet progression provided.  Eat 4-5 times/day.       Return to work: 2 weeks       Follow up: 4 weeks    YOU ARE DOING GREAT, you are 2 weeks post-op.    Things will get easier over time.   Work your way off the shakes and get all nutrition through food.  You may transition to pureed foods once your dietician advances your diet. Note: Purees are spreadable consistency NO CHUNKS,  you then can move to soft foods (able to mash with fork).  Continue to aim for 60 grams of protein and 64 oz of water daily.  Increase the duration and frequency of your exercise regimen as you are able.  Your goal is 1 hour/day .  Continue to take vitamin supplements as directed.  Remember to take your multivitamins 2 times a day, once in the morning and once in the evening.   Take you calcium 2-3 times a day, at least two hours apart from the multivitamin.  Come to support groups.  See the dietician as needed.  Schedule a follow-up visit in 4 weeks.     Dr. Hilary Wolf M.D., MPH  Director of Bariatric & Minimally Invasive Surgery  [address] [address]  T:  [phone]  F:  [phone]

## 2025-06-10 ENCOUNTER — TELEPHONE (OUTPATIENT)
Dept: SURGERY | Facility: CLINIC | Age: 40
End: 2025-06-10
Payer: COMMERCIAL

## 2025-06-10 NOTE — TELEPHONE ENCOUNTER
Called the pt to review the soft food diet. Answered all questions and encouraged the pt to reach out to this RD if she has any questions.     Shelby Thomason MS, RD, LD  Phone: [phone]

## 2025-06-13 ENCOUNTER — TELEPHONE (OUTPATIENT)
Dept: SLEEP MEDICINE | Facility: CLINIC | Age: 40
End: 2025-06-13
Payer: COMMERCIAL

## 2025-06-24 LAB
ATRIAL RATE: 55 BPM
P AXIS: 47 DEGREES
P OFFSET: 182 MS
P ONSET: 151 MS
PR INTERVAL: 124 MS
Q ONSET: 213 MS
QRS COUNT: 9 BEATS
QRS DURATION: 94 MS
QT INTERVAL: 446 MS
QTC CALCULATION(BAZETT): 426 MS
QTC FREDERICIA: 433 MS
R AXIS: 20 DEGREES
T AXIS: 9 DEGREES
T OFFSET: 436 MS
VENTRICULAR RATE: 55 BPM

## 2025-06-30 NOTE — PROGRESS NOTES
"Follow Up Bariatric Nutrition Assessment    Name: Lula Del Angel  MRN: [id number]  Date: 07/03/25     Surgery Date:  5/22/25  Surgeon:  Luciano   Procedure:  Sleeve gastrectomy     ASSESSMENT:  Current weight:     Vitals:    07/03/25 1306   Weight: 110 kg (243 lb)                  Ht:  1.626 m (5' 4\")      BMI:  Body mass index is 41.71 kg/m².    Previous weight:   273lbs  Initial start weight:  330lbs  EBW: 180lbs  Total weight change:  87lbs  %EBW Lost: 48.3%    PROGRESS:  Nutrition Interventions for last encounter   Continue to drink your protein shakes to meet your goal of 60-70 g of protein per day. Begin measuring how much protein you can eat on the soft diet so that you know when to start weaning off of your protein shakes.   Continue to drink 64 oz. of zero calorie beverages per day  Continue no drinking 30 min before, during the meal and for 30 minutes after the meal  Continue to exercise  Advance to the puree diet for 1 week, then soft food for 3 weeks  Remember to eat slowly and chew thoroughly  Try one new food at a time to test for any intolerances.   Continue to take all of your vitamins and minerals.     CHANGES IN TREATMENT:   Patient met goals: Yes      24 hour food recall: tracking calories and getting ~600kcals daily   Breakfast:  Quest shake (30g)  Snack:   Lunch: HB egg  Snack:     Dinner:  chicken/turkey meatball (2 small balls) w/cheese   Snack: Quest shake (30g)  Beverages:  water, prune juice (4-6oz)   Alcohol: no     Vitamins:  Bariatric Advantage MVI w/B12, Bariatric advantage (250mg each)  500 mg Calcium    Medications: Current Medications[1]    Physical Activity: tracking steps and getting 5,000 steps daily     READINESS TO LEARN:  Motivation to learn: Interested      Understanding of instruction: Good     Anticipated Compliance: Good       Family Support: Unable to assess-family not present     Patient presents with post-op weight loss surgery sleeve gastrectomy. The pt is 6 weeks postop.  " Patient has lost 87 pounds since initial assessment accounting for 48.3% loss excess body weight.  Tolerating soft diet without difficulty.  Protein intake is  adequate for post-op individual. Fluid consumption is adequate. Is doing well with the 30s rule.  Patient is supplementing recommended vitamin/minerals. Pt states no concerns/difficulties.  Discussed the transition diet. Reminded pt to eat slowly, chew thoroughly and to try on new food at a time.     Malnutrition Screening  Significant unintentional weight loss? n/a  Eating less than 75% of usual intake for more than 2 weeks? n/a    Nutrition Diagnosis:   Increased protein and nutrition needs related to altered GI function as evidenced by pt. s/p sleeve gastrectomy.  Food- and nutrition-related knowledge deficit related to lack of prior exposure to surgical weight loss information as evidenced by diet recall.     Nutrition Interventions:   Modify type and amount of food and nutrients within meals and snacks.  Comprehensive Nutrition Education  -Nutrition education materials: none    Recommendations:    Eat 60-70 g of protein per day  Drink 64 oz. of zero calorie beverages per day  Continue no drinking 30 min before, during the meal and for 30 minutes after the meal  Increase intensity and duration of exercise  Advance to transition diet. Try raw veggies, fresh fruit and lean ground beef.   Eat slowly, chew thoroughly  Try one new food at a time.   Continue current vit/min regimen    Nutrition Monitoring and Evaluation:   1-2 pounds weight loss per week  Criteria: weight check, food recall  Need for Follow-up: 3 months    Shelby Thomason MS, RD, LD  Phone: [phone]         [1]   Current Outpatient Medications:     omeprazole (PriLOSEC) 40 mg DR capsule, Take 1 capsule (40 mg) by mouth once daily in the morning. Take before meals. Please remove capsule and use granules and mix with sugar free pudding or jello, Disp: 30 capsule, Rfl: 5

## 2025-07-01 NOTE — PROGRESS NOTES
WEIGHT LOSS SURGERY CLINIC  6 WEEKS FOLLOW UP NOTE    CLINIC DATE: 7/10/25    NAME:  Lula Del Angel 40 y.o.  MRN:  [id number]    DATE OF SURGERY:  5/22/25   SURGICAL PROCEDURE:  Laparoscopic sleeve gastrectomy  87751    EXTRA PROCEDURES:  None    COMPLICATIONS DURING ADMISSION: None    Virtual or Telephone Consent    An interactive audio and video telecommunication system which permits real time communications between the patient (at the originating site) and provider (at the distant site) was utilized to provide this telehealth service.   Verbal consent was requested and obtained from Lula Del Angel on this date, 07/11/25 for a telehealth visit and the patient's location was confirmed at the time of the visit.     INITIAL WEIGHT:  326  LBS  PRE-SURGICAL WEIGHT:  271  LBS  THIS VISIT WEIGHT / BMI:   Wt Readings from Last 1 Encounters:   07/10/25 109 kg (241 lb)      BMI Readings from Last 1 Encounters:   07/10/25 41.37 kg/m²     WEIGHT / BMI HX:    Wt Readings from Last 3 Encounters:   07/10/25 109 kg (241 lb)   07/03/25 110 kg (243 lb)   06/09/25 112 kg (247 lb)      BMI Readings from Last 3 Encounters:   07/10/25 41.37 kg/m²   07/03/25 41.71 kg/m²   06/09/25 42.40 kg/m²        TOTAL WEIGHT LOSS:  85  LBS    SURGEON:  Dr. MARIA EUGENIA Wolf    PRESENTING TODAY FOR BARIATRIC POST-OP VISIT:  6 Weeks    CURRENT SYMPTOMS:      Abdominal Pain:  No           Burping: Yes, occ     Constipation: Yes, 3 weeks ago singular episode    Diarrhea: No    Dumping Syndrome: No    Hiccups: No    Hunger: Yes    Food Intolerances: No     Nausea: No    Vomiting: No    EATING HABITS HISTORY:       Carbonated Beverages: No          Caffeinated Beverages: No    Time To Eat Meals 30 Minutes: Yes    Fluid Intake 60-64 oz/day:  Yes      Protein Intake 60 grams/day: Yes    Breakfast: protein shake     Lunch: 1.9 oz ground chicken + 2 tbs of mashed potatoes and 1/2 slice of provolone chicken    Dinner: 2.2 oz ground chicken + baked red potato + 1 tbs of  humus     Snacks: protein shake       EXERCISE: Yes, does lots of yard work, re-started going to gym        PROVIDER LAST IMPRESSION VISIT NOTE:      Lula Del Angel is a 40 y.o. y.o. female with a BMI of Body mass index is 46.62 kg/m².. : htn and can benefits from weight loss.  We discussed the sleeve at length.  She is a great candidate.  She has a supportive  who also had gastric bypass.  We discussed focused exercise. She is low risk for surgery.  She has never had surgery.   When I first met her, bmi was 54.  She has made multiple lifestyle changes and is eager to proceed.  She has already lost 66 lbs     She is now 2 weeks post sleeve overall oding well and counseled on purees.  Counseled on diet and exercise.   She has almost lost 100 lbs but her energy is still low.  Counseled to  keep on getting more nutrition.  Htn improved and not using her cpap.  Leg swelling improved.        Plan:       Activity: Increase activity as tolerated. If it hurts don't do it.  Otherwise you can do anything you want.       Counseling: Start Vitamins and Omeprazole if you haven't already.       Diet: Advance diet per Handbook/Dietician Instructions, Education on Pureed Diet Phase (spreadable consistency NO CHUNKS) and appropriate diet progression provided.  Eat 4-5 times/day.       Return to work: 2 weeks       Follow up: 4 weeks  You can peel the dressings off  YOU ARE DOING GREAT, you are 2 weeks post-op.    Things will get easier over time.   Work your way off the shakes and get all nutrition through food.  You may transition to pureed foods once your dietician advances your diet. Note: Purees are spreadable consistency NO CHUNKS,  you then can move to soft foods (able to mash with fork).  Continue to aim for 60 grams of protein and 64 oz of water daily.  Increase the duration and frequency of your exercise regimen as you are able.  Your goal is 1 hour/day .  Continue to take vitamin supplements as directed.  Remember to  take your multivitamins 2 times a day, once in the morning and once in the evening.   Take you calcium 2-3 times a day, at least two hours apart from the multivitamin.  Come to support groups.  See the dietician as needed.  Schedule a follow-up visit in 4 weeks.      Dr. Hilary Wolf M.D., MPH  Director of Bariatric & Minimally Invasive Surgery  [place] [address]  T:  [phone]  F:  [phone]                Electronically signed by Hilary Wolf MD MPH at 2025  3:08 PM    CURRENT MEDICATIONS:  Current Medications[1]    PAST MEDICAL HISTORY:  Problem List[2]    PAST SURGICAL HISTORY:  Surgical History[3]    FAMILY HISTORY:  Family History[4]    PAST SOCIAL HX:  Social History     Socioeconomic History    Marital status:      Spouse name: Not on file    Number of children: Not on file    Years of education: Not on file    Highest education level: Not on file   Occupational History    Not on file   Tobacco Use    Smoking status: Former     Current packs/day: 0.00     Types: Cigarettes     Quit date: 2017     Years since quittin.6    Smokeless tobacco: Never    Tobacco comments:     Quit in 2017. Smoked about 1/3 pack per day for 15 years   Vaping Use    Vaping status: Never Used   Substance and Sexual Activity    Alcohol use: Not Currently    Drug use: Never    Sexual activity: Not Currently     Partners: Male     Birth control/protection: Abstinence   Other Topics Concern    Not on file   Social History Narrative    Not on file     Social Drivers of Health     Financial Resource Strain: Low Risk  (2025)    Overall Financial Resource Strain (CARDIA)     Difficulty of Paying Living Expenses: Not hard at all   Food Insecurity: No Food Insecurity (2025)    Hunger Vital Sign     Worried About Running Out of Food in the Last Year: Never true     Ran Out of Food in the Last Year: Never true   Transportation Needs: No Transportation Needs  (5/23/2025)    PRAPARE - Transportation     Lack of Transportation (Medical): No     Lack of Transportation (Non-Medical): No   Physical Activity: Not on file   Stress: Not on file   Social Connections: Not on file   Intimate Partner Violence: Not At Risk (5/22/2025)    Humiliation, Afraid, Rape, and Kick questionnaire     Fear of Current or Ex-Partner: No     Emotionally Abused: No     Physically Abused: No     Sexually Abused: No   Housing Stability: Low Risk  (5/23/2025)    Housing Stability Vital Sign     Unable to Pay for Housing in the Last Year: No     Number of Times Moved in the Last Year: 0     Homeless in the Last Year: No       ALLERGIES:  RX Allergies[5]    DAILY SUPPLEMENTS:  Calcium: Calcium Citrate w/ vitamin D (1200 - 1500mg) - yes  Multivitamin & Minerals: 2 per day - yes, bariatric advantage   Iron Supplement: included in multi-vitamin  Vitamin B12: 1000 mcg included in multi-vitamin  Vitamin D3: 3000 units.   Other: PPI     HPI: Patient is 6 weeks s/p sleeve gastrectomy. Patient is compliant with her protein and fluids goals, MVIs/supplements. She is physically active. Steady weight loss. Great energy.     REVIEW OF SYSTEMS:  CONSTITUTIONAL: Patient denies fevers, chills, sweats and weight changes.  EYES: Patient denies any visual symptoms.  EARS, NOSE, AND THROAT: No difficulties with hearing. No symptoms of rhinitis or sore throat.  CARDIOVASCULAR: Patient denies chest pains, palpitations, orthopnea and paroxysmal nocturnal dyspnea.  RESPIRATORY: No dyspnea on exertion, no wheezing or cough.  GI: No nausea, vomiting, diarrhea, constipation, abdominal pain, hematochezia or melena.  : No urinary hesitancy or dribbling. No nocturia or urinary frequency. No abnormal urethral discharge.  MUSCULOSKELETAL: No myalgias or arthralgias.  NEUROLOGIC: No chronic headaches, no seizures. Patient denies numbness, tingling or weakness.  PSYCHIATRIC: Patient denies problems with mood disturbance. No problems  with anxiety.  ENDOCRINE: No excessive urination or excessive thirst.  DERMATOLOGIC: Patient denies any rashes or skin changes.    PHYSICAL EXAM:  There were no vitals taken for this visit.  General- No acute distress, well appearing and well nourished.  HEENT - WNL  Pulmonary - respiratory effort normal  Skin - no visible rashes or lesions  Neurologic -  WNL, no obvious abnormalities   Psychiatric - AXO x3, mood and affect appropriate       PROVIDER VISIT IMPRESSIONS:      A/P: Normal post-OP course      No sign of infection, Doing well, tolerating diet, and Mobility improved      Energy: Energy improved      Weight loss: Steady      Recommendations: Fluid intake 60 oz/day, Protein 60 g/day, Work way off shakes, counseled on diet: regular, Plan your meals, Follow pouch rules, Continue taking vitamins as directed., 3 month labs, and Support groups      Follow up: 6 weeks      Congratulations, you are 6 weeks post-op: Surgical Procedure: Laparoscopic sleeve gastrectomy  [address] into your journey!    The rules of 60--Continue to aim for 60 grams of protein and 64 oz of water a day.    Be sure to take you multivitamins, B12 and calcium daily.    Continue to exercise, increase intensity and variety of your exercise routine, and aim for 300 minutes a week total.    Come to support groups.    Follow up with the Dietician as needed..    Schedule at the  a follow up visit in 6 weeks for your 3 month post-op follow up visit.          [1]   Current Outpatient Medications   Medication Sig Dispense Refill    omeprazole (PriLOSEC) 40 mg DR capsule Take 1 capsule (40 mg) by mouth once daily in the morning. Take before meals. Please remove capsule and use granules and mix with sugar free pudding or jello 30 capsule 5     No current facility-administered medications for this visit.   [2]   Patient Active Problem List  Diagnosis    Essential (primary) hypertension    Bilateral leg edema    CLAYTON (obstructive sleep apnea)     Bariatric surgery status    Morbid (severe) obesity due to excess calories (Multi)    Class 3 severe obesity with body mass index (BMI) of 40.0 to 44.9 in adult    S/P gastric sleeve procedure    Intestinal malabsorption following gastrectomy ([facility])    Dietary counseling   [3]   Past Surgical History:  Procedure Laterality Date    ESOPHAGOGASTRODUODENOSCOPY  03/21/2025    SLEEVE GASTROPLASTY  05/22/2025    Lap Sleeve w/Dr. Wolf at [facility]    WISDOM TOOTH EXTRACTION     [4]   Family History  Problem Relation Name Age of Onset    Hypertension Mother Pia     Colon polyps Mother Pia     Hypertension Father Aidan     Diabetes Father Aidan     Cancer Mother's Sister Isa     Cancer Maternal Grandmother Heidi    [5]   Allergies  Allergen Reactions    Avocado GI Upset    Ceclor [Cefaclor] Hives and Fever    Codeine Hives and Fever    Sulfa (Sulfonamide Antibiotics) Hives and Fever

## 2025-07-02 ENCOUNTER — TELEPHONE (OUTPATIENT)
Dept: SURGERY | Facility: CLINIC | Age: 40
End: 2025-07-02

## 2025-07-02 ENCOUNTER — APPOINTMENT (OUTPATIENT)
Dept: SURGERY | Facility: CLINIC | Age: 40
End: 2025-07-02
Payer: COMMERCIAL

## 2025-07-02 NOTE — TELEPHONE ENCOUNTER
Ulisses Cotton per our conversation I just reached out to our [facility] Out Patient Pharmacist to have your Omeprazole script transferred to [address] [address] T:  [phone].  Our pharmacy will open at 9AM so I asked them to call me back to confirm when the script is transferred today.  Thanks and have a great day!  Martha Barron, [facility] Nurse [facility].

## 2025-07-03 ENCOUNTER — NUTRITION (OUTPATIENT)
Dept: SURGERY | Facility: CLINIC | Age: 40
End: 2025-07-03
Payer: COMMERCIAL

## 2025-07-03 ENCOUNTER — APPOINTMENT (OUTPATIENT)
Dept: SURGERY | Facility: CLINIC | Age: 40
End: 2025-07-03
Payer: COMMERCIAL

## 2025-07-03 ENCOUNTER — TELEPHONE (OUTPATIENT)
Dept: SURGERY | Facility: CLINIC | Age: 40
End: 2025-07-03

## 2025-07-03 VITALS — HEIGHT: 64 IN | WEIGHT: 243 LBS | BODY MASS INDEX: 41.48 KG/M2

## 2025-07-03 NOTE — TELEPHONE ENCOUNTER
This RN called patient after walking down to [facility]'s pharmacy and having patient's medication transferred over to [facility]. Pharmacy was able to send medication over. RN educated patient about the medication being sent over. Patient verbalized understanding.

## 2025-07-10 ENCOUNTER — APPOINTMENT (OUTPATIENT)
Dept: SURGERY | Facility: CLINIC | Age: 40
End: 2025-07-10
Payer: COMMERCIAL

## 2025-07-10 VITALS — BODY MASS INDEX: 41.15 KG/M2 | HEIGHT: 64 IN | WEIGHT: 241 LBS

## 2025-07-10 DIAGNOSIS — Z71.3 DIETARY COUNSELING: ICD-10-CM

## 2025-07-10 DIAGNOSIS — Z90.3 S/P GASTRIC SLEEVE PROCEDURE: ICD-10-CM

## 2025-07-10 DIAGNOSIS — Z90.3 INTESTINAL MALABSORPTION FOLLOWING GASTRECTOMY (HHS-HCC): Primary | ICD-10-CM

## 2025-07-10 DIAGNOSIS — E66.01 MORBID (SEVERE) OBESITY DUE TO EXCESS CALORIES (MULTI): ICD-10-CM

## 2025-07-10 DIAGNOSIS — E66.813 CLASS 3 SEVERE OBESITY WITH BODY MASS INDEX (BMI) OF 40.0 TO 44.9 IN ADULT: ICD-10-CM

## 2025-07-10 DIAGNOSIS — K91.2 INTESTINAL MALABSORPTION FOLLOWING GASTRECTOMY (HHS-HCC): Primary | ICD-10-CM

## 2025-07-10 PROCEDURE — 3008F BODY MASS INDEX DOCD: CPT

## 2025-07-10 PROCEDURE — 99024 POSTOP FOLLOW-UP VISIT: CPT

## 2025-07-11 PROBLEM — K91.2 INTESTINAL MALABSORPTION FOLLOWING GASTRECTOMY (HHS-HCC): Status: ACTIVE | Noted: 2025-07-11

## 2025-07-11 PROBLEM — E66.813 CLASS 3 SEVERE OBESITY WITH BODY MASS INDEX (BMI) OF 40.0 TO 44.9 IN ADULT: Status: ACTIVE | Noted: 2025-07-11

## 2025-07-11 PROBLEM — Z90.3 S/P GASTRIC SLEEVE PROCEDURE: Status: ACTIVE | Noted: 2025-07-11

## 2025-07-11 PROBLEM — Z71.3 DIETARY COUNSELING: Status: ACTIVE | Noted: 2025-07-11

## 2025-07-11 PROBLEM — Z90.3 INTESTINAL MALABSORPTION FOLLOWING GASTRECTOMY (HHS-HCC): Status: ACTIVE | Noted: 2025-07-11

## 2025-08-03 LAB
25(OH)D3+25(OH)D2 SERPL-MCNC: 82 NG/ML (ref 30–100)
ALBUMIN SERPL-MCNC: 3.7 G/DL (ref 3.6–5.1)
ALP SERPL-CCNC: 43 U/L (ref 31–125)
ALT SERPL-CCNC: 13 U/L (ref 6–29)
ANION GAP SERPL CALCULATED.4IONS-SCNC: 9 MMOL/L (CALC) (ref 7–17)
AST SERPL-CCNC: 11 U/L (ref 10–30)
BILIRUB SERPL-MCNC: 0.6 MG/DL (ref 0.2–1.2)
BUN SERPL-MCNC: 11 MG/DL (ref 7–25)
CALCIUM SERPL-MCNC: 8.8 MG/DL (ref 8.6–10.2)
CHLORIDE SERPL-SCNC: 104 MMOL/L (ref 98–110)
CO2 SERPL-SCNC: 28 MMOL/L (ref 20–32)
CREAT SERPL-MCNC: 0.57 MG/DL (ref 0.5–0.99)
EGFRCR SERPLBLD CKD-EPI 2021: 116 ML/MIN/1.73M2
ERYTHROCYTE [DISTWIDTH] IN BLOOD BY AUTOMATED COUNT: 15.1 % (ref 11–15)
FERRITIN SERPL-MCNC: 37 NG/ML (ref 16–232)
FOLATE SERPL-MCNC: 14.5 NG/ML
GLUCOSE SERPL-MCNC: 83 MG/DL (ref 65–99)
HCT VFR BLD AUTO: 42.3 % (ref 35–45)
HGB BLD-MCNC: 13.6 G/DL (ref 11.7–15.5)
IRON SATN MFR SERPL: 40 % (CALC) (ref 16–45)
IRON SERPL-MCNC: 90 MCG/DL (ref 40–190)
MCH RBC QN AUTO: 30 PG (ref 27–33)
MCHC RBC AUTO-ENTMCNC: 32.2 G/DL (ref 32–36)
MCV RBC AUTO: 93.2 FL (ref 80–100)
PLATELET # BLD AUTO: 246 THOUSAND/UL (ref 140–400)
PMV BLD REES-ECKER: 13.2 FL (ref 7.5–12.5)
POTASSIUM SERPL-SCNC: 4.2 MMOL/L (ref 3.5–5.3)
PROT SERPL-MCNC: 6.5 G/DL (ref 6.1–8.1)
PTH-INTACT SERPL-MCNC: 43 PG/ML (ref 16–77)
RBC # BLD AUTO: 4.54 MILLION/UL (ref 3.8–5.1)
SODIUM SERPL-SCNC: 141 MMOL/L (ref 135–146)
TIBC SERPL-MCNC: 225 MCG/DL (CALC) (ref 250–450)
VIT B1 BLD-SCNC: NORMAL NMOL/L
VIT B12 SERPL-MCNC: 1108 PG/ML (ref 200–1100)
WBC # BLD AUTO: 4.4 THOUSAND/UL (ref 3.8–10.8)

## 2025-08-07 LAB
25(OH)D3+25(OH)D2 SERPL-MCNC: 82 NG/ML (ref 30–100)
ALBUMIN SERPL-MCNC: 3.7 G/DL (ref 3.6–5.1)
ALP SERPL-CCNC: 43 U/L (ref 31–125)
ALT SERPL-CCNC: 13 U/L (ref 6–29)
ANION GAP SERPL CALCULATED.4IONS-SCNC: 9 MMOL/L (CALC) (ref 7–17)
AST SERPL-CCNC: 11 U/L (ref 10–30)
BILIRUB SERPL-MCNC: 0.6 MG/DL (ref 0.2–1.2)
BUN SERPL-MCNC: 11 MG/DL (ref 7–25)
CALCIUM SERPL-MCNC: 8.8 MG/DL (ref 8.6–10.2)
CHLORIDE SERPL-SCNC: 104 MMOL/L (ref 98–110)
CO2 SERPL-SCNC: 28 MMOL/L (ref 20–32)
CREAT SERPL-MCNC: 0.57 MG/DL (ref 0.5–0.99)
EGFRCR SERPLBLD CKD-EPI 2021: 116 ML/MIN/1.73M2
ERYTHROCYTE [DISTWIDTH] IN BLOOD BY AUTOMATED COUNT: 15.1 % (ref 11–15)
FERRITIN SERPL-MCNC: 37 NG/ML (ref 16–232)
FOLATE SERPL-MCNC: 14.5 NG/ML
GLUCOSE SERPL-MCNC: 83 MG/DL (ref 65–99)
HCT VFR BLD AUTO: 42.3 % (ref 35–45)
HGB BLD-MCNC: 13.6 G/DL (ref 11.7–15.5)
IRON SATN MFR SERPL: 40 % (CALC) (ref 16–45)
IRON SERPL-MCNC: 90 MCG/DL (ref 40–190)
MCH RBC QN AUTO: 30 PG (ref 27–33)
MCHC RBC AUTO-ENTMCNC: 32.2 G/DL (ref 32–36)
MCV RBC AUTO: 93.2 FL (ref 80–100)
PLATELET # BLD AUTO: 246 THOUSAND/UL (ref 140–400)
PMV BLD REES-ECKER: 13.2 FL (ref 7.5–12.5)
POTASSIUM SERPL-SCNC: 4.2 MMOL/L (ref 3.5–5.3)
PROT SERPL-MCNC: 6.5 G/DL (ref 6.1–8.1)
PTH-INTACT SERPL-MCNC: 43 PG/ML (ref 16–77)
RBC # BLD AUTO: 4.54 MILLION/UL (ref 3.8–5.1)
SODIUM SERPL-SCNC: 141 MMOL/L (ref 135–146)
TIBC SERPL-MCNC: 225 MCG/DL (CALC) (ref 250–450)
VIT B1 BLD-SCNC: 155 NMOL/L (ref 78–185)
VIT B12 SERPL-MCNC: 1108 PG/ML (ref 200–1100)
WBC # BLD AUTO: 4.4 THOUSAND/UL (ref 3.8–10.8)

## 2025-08-08 ENCOUNTER — TELEPHONE (OUTPATIENT)
Dept: SLEEP MEDICINE | Facility: CLINIC | Age: 40
End: 2025-08-08
Payer: COMMERCIAL

## 2025-08-13 ENCOUNTER — APPOINTMENT (OUTPATIENT)
Dept: SURGERY | Facility: CLINIC | Age: 40
End: 2025-08-13
Payer: COMMERCIAL

## 2025-08-14 ENCOUNTER — APPOINTMENT (OUTPATIENT)
Dept: SURGERY | Facility: CLINIC | Age: 40
End: 2025-08-14
Payer: COMMERCIAL

## 2025-08-15 ENCOUNTER — APPOINTMENT (OUTPATIENT)
Dept: SURGERY | Facility: CLINIC | Age: 40
End: 2025-08-15
Payer: COMMERCIAL

## 2025-08-15 VITALS — BODY MASS INDEX: 39.78 KG/M2 | WEIGHT: 233 LBS | HEIGHT: 64 IN

## 2025-08-19 DIAGNOSIS — Z12.31 ENCOUNTER FOR SCREENING MAMMOGRAM FOR BREAST CANCER: ICD-10-CM

## 2025-08-20 ENCOUNTER — RESULTS FOLLOW-UP (OUTPATIENT)
Dept: SURGERY | Facility: CLINIC | Age: 40
End: 2025-08-20
Payer: COMMERCIAL

## 2025-08-21 ENCOUNTER — APPOINTMENT (OUTPATIENT)
Dept: SURGERY | Facility: CLINIC | Age: 40
End: 2025-08-21
Payer: COMMERCIAL

## 2025-08-21 VITALS — HEIGHT: 64 IN | WEIGHT: 229.8 LBS | BODY MASS INDEX: 39.23 KG/M2

## 2025-08-21 DIAGNOSIS — E66.01 MORBID (SEVERE) OBESITY DUE TO EXCESS CALORIES (MULTI): ICD-10-CM

## 2025-08-21 DIAGNOSIS — Z71.3 DIETARY COUNSELING: ICD-10-CM

## 2025-08-21 DIAGNOSIS — Z90.3 INTESTINAL MALABSORPTION FOLLOWING GASTRECTOMY (HHS-HCC): Primary | ICD-10-CM

## 2025-08-21 DIAGNOSIS — K91.2 INTESTINAL MALABSORPTION FOLLOWING GASTRECTOMY (HHS-HCC): Primary | ICD-10-CM

## 2025-08-21 DIAGNOSIS — E66.813 CLASS 3 SEVERE OBESITY WITH BODY MASS INDEX (BMI) OF 40.0 TO 44.9 IN ADULT: ICD-10-CM

## 2025-08-21 DIAGNOSIS — Z90.3 S/P GASTRIC SLEEVE PROCEDURE: ICD-10-CM

## 2025-08-21 PROCEDURE — 99024 POSTOP FOLLOW-UP VISIT: CPT

## 2025-08-21 PROCEDURE — 3008F BODY MASS INDEX DOCD: CPT

## 2025-10-15 ENCOUNTER — APPOINTMENT (OUTPATIENT)
Dept: SURGERY | Facility: CLINIC | Age: 40
End: 2025-10-15
Payer: COMMERCIAL

## (undated) DEVICE — ASSEMBLY, STRYKER FLOW 2, SUCTION IRRIGATOR, WITH TIP

## (undated) DEVICE — SCISSOR, MINI ENDO CUT, TIPS, DISP

## (undated) DEVICE — CLOSURE SYSTEM, FASCIAL, EFX ENDO

## (undated) DEVICE — SOLUTION, IV 0.9% NACL INJECTION USP 1000ML EXCEL PLUS, BAG

## (undated) DEVICE — TITAN KIT, SGS  23CM, STAPLER WITH SLR

## (undated) DEVICE — DISSECTOR, ULTRASONIC, SONICISION, CORDLESS, 48CM, CRVD JAW

## (undated) DEVICE — ENDO, CLIP, 5MM, M/L

## (undated) DEVICE — APPLICATOR, FOAM BARRIER, LARGE

## (undated) DEVICE — TROCAR, KII OPTICAL BLADELESS 5MM Z THREAD 100MM LNGTH

## (undated) DEVICE — Device

## (undated) DEVICE — TROCAR, TITAN SGS, STANDARD 19MM

## (undated) DEVICE — CLEAN KIT, ANTIFOG SCOPE, SEE SHARP 150MM

## (undated) DEVICE — SYRINGE, 60 CC, IRRIGATION, BULB, CONTRO-BULB, PAPER POUCH

## (undated) DEVICE — SUTURE, CTD, VICRYL 3-0, UND, BR, SH-1

## (undated) DEVICE — SYSTEM, GASTRECTOMY SLEEVE, 36FR W/O BULB, VISIGI 3D

## (undated) DEVICE — SUTURE, VICRYL, 4-0, 18 IN, PS2, UNDYED

## (undated) DEVICE — TUBE SET, PNEUMOCLEAR, SMOKE EVACU, HIGH-FLOW

## (undated) DEVICE — MARKER, SKIN, DUAL TIP INK W/9 LABEL AND REMOVABLE TIME OUT SLEEVE